# Patient Record
Sex: MALE | Race: WHITE | NOT HISPANIC OR LATINO | URBAN - METROPOLITAN AREA
[De-identification: names, ages, dates, MRNs, and addresses within clinical notes are randomized per-mention and may not be internally consistent; named-entity substitution may affect disease eponyms.]

---

## 2018-09-12 ENCOUNTER — OUTPATIENT (OUTPATIENT)
Dept: OUTPATIENT SERVICES | Facility: HOSPITAL | Age: 63
LOS: 1 days | Discharge: HOME | End: 2018-09-12

## 2018-09-12 DIAGNOSIS — M25.561 PAIN IN RIGHT KNEE: ICD-10-CM

## 2022-07-08 PROBLEM — Z00.00 ENCOUNTER FOR PREVENTIVE HEALTH EXAMINATION: Status: ACTIVE | Noted: 2022-07-08

## 2022-07-12 ENCOUNTER — APPOINTMENT (OUTPATIENT)
Dept: PAIN MANAGEMENT | Facility: CLINIC | Age: 67
End: 2022-07-12

## 2022-07-12 VITALS
WEIGHT: 205 LBS | SYSTOLIC BLOOD PRESSURE: 146 MMHG | HEIGHT: 71 IN | DIASTOLIC BLOOD PRESSURE: 91 MMHG | HEART RATE: 74 BPM | BODY MASS INDEX: 28.7 KG/M2

## 2022-07-12 DIAGNOSIS — Z86.39 PERSONAL HISTORY OF OTHER ENDOCRINE, NUTRITIONAL AND METABOLIC DISEASE: ICD-10-CM

## 2022-07-12 DIAGNOSIS — Z87.891 PERSONAL HISTORY OF NICOTINE DEPENDENCE: ICD-10-CM

## 2022-07-12 LAB
AMPHET UR-MCNC: NEGATIVE
BARBITURATES UR-MCNC: NEGATIVE
BENZODIAZ UR-MCNC: POSITIVE
COCAINE METAB.OTHER UR-MCNC: NEGATIVE
DRUG SCREEN, URINE ROUTINE: NEGATIVE
METHADONE UR-MCNC: NEGATIVE
METHAQUALONE UR-MCNC: NEGATIVE
OPIATES UR-MCNC: POSITIVE
PCP UR-MCNC: NEGATIVE
PROPOXYPH UR QL: NEGATIVE
THC UR QL: NEGATIVE

## 2022-07-12 PROCEDURE — 99214 OFFICE O/P EST MOD 30 MIN: CPT | Mod: PA

## 2022-07-12 PROCEDURE — 99072 ADDL SUPL MATRL&STAF TM PHE: CPT

## 2022-07-12 PROCEDURE — 80305 DRUG TEST PRSMV DIR OPT OBS: CPT | Mod: QW

## 2022-07-12 NOTE — PHYSICAL EXAM
[Normal Coordination] : normal coordination [Normal Sensation] : normal sensation [Normal Mood and Affect] : normal mood and affect [Orientated] : orientated [Normal Skin] : normal skin [Well Developed] : well developed [Well Nourished] : well nourished [de-identified] : Palpation of the thoracic/lumbar spine is as follows: bilateral lumbar paraspinal tenderness. Range of motion of the thoracic and lumbar spine is as follows: Diminished range of motion in all planes. Gait and function is as follows: mildly antalgic gait.

## 2022-07-12 NOTE — REASON FOR VISIT
[Follow-Up Visit] : a follow-up pain management visit [FreeTextEntry2] : Follow up Lumbar pain . Patient needs med refill

## 2022-07-12 NOTE — ASSESSMENT
[FreeTextEntry1] : This is a 65 yo M who suffered a work related injury resulting in chronic lumbosacral pain complaints due to degenerative changes for which the use of medication is to continue at this time.\par \par moderate complexity- UDS collected.

## 2022-07-12 NOTE — HISTORY OF PRESENT ILLNESS
[FreeTextEntry1] : A continuing active encounter took place at this time; previous history and physical exam reviewed.\par \par HISTORY OF PRESENT ILLNESS: As a review, Mr. Andrew Smith is a former  who states he was lifting a large piece of granite back on July 21, 2008, injuring his lower back and left shoulder. He states that he worked for another two years and ended up retiring in January 2010. He states that his left shoulder pain improved, however, he is having persistent lower back pain which radiates down his left leg and is associated with paresthesias and dysesthesias. He describes his pain as severe, constant, 100% of the time, in no particular pattern, burning, sharp, shooting, dull, and aching with numbness in the lower extremities.\par \par TODAY: he presents for a revisit encounter. He notes a slight increase in pain and stiffness within the low back. Radicular features at times noted but for the most part he notes isolated low back pain with functional deficits. The use of medication in the form of Percocet at 7.5/325m PO q8h provides benefit at 50% or higher and he remains content with his improvements in pain at this time.\par \par Of note, we have discussed possibly reducing his regimen over the coming weeks; he will consider such change at his September appt.

## 2022-07-12 NOTE — REVIEW OF SYSTEMS
[Arthralgia] : arthralgia [Chills] : no chills [Discharge] : no discharge [Decrease Hearing] : no decrease in hearing [Lower Ext Edema] : no lower extremity edema [SOB at rest] : no shortness of breath at rest [Abdominal Pain] : no abdominal pain

## 2022-07-12 NOTE — DISCUSSION/SUMMARY
[de-identified] : The patient is stable on current pain medication with analgesia and without notable side effects or any obvious aberrant behaviors exhibited. Will renew medication today.\par \par Urine drug screening collected today with rapid sample result consistent with given regimen. Sample to be sent for confirmatory testing with additional relief at a later time.\par \par f/u 4 weeks\par \par I personally reviewed with the PA, this patient's history and physical exam findings, as documented above. I have discussed the relevant areas of concern, having direct implications to the presenting problems and illnesses, and I have personally examined all pertinent and positive and negative findings, which impact on the prior pain management treatment. \par \par DELGADO Michele MD\par \par \par

## 2022-09-13 ENCOUNTER — APPOINTMENT (OUTPATIENT)
Dept: PAIN MANAGEMENT | Facility: CLINIC | Age: 67
End: 2022-09-13

## 2022-10-21 ENCOUNTER — APPOINTMENT (OUTPATIENT)
Dept: PAIN MANAGEMENT | Facility: CLINIC | Age: 67
End: 2022-10-21

## 2022-10-21 VITALS
HEART RATE: 67 BPM | BODY MASS INDEX: 28.7 KG/M2 | HEIGHT: 71 IN | SYSTOLIC BLOOD PRESSURE: 135 MMHG | DIASTOLIC BLOOD PRESSURE: 88 MMHG | WEIGHT: 205 LBS

## 2022-10-21 PROCEDURE — 99214 OFFICE O/P EST MOD 30 MIN: CPT

## 2022-10-21 PROCEDURE — 96102W: CUSTOM

## 2022-10-21 PROCEDURE — 99072 ADDL SUPL MATRL&STAF TM PHE: CPT

## 2022-10-21 RX ORDER — PREDNISONE 50 MG/1
50 TABLET ORAL
Qty: 5 | Refills: 0 | Status: ACTIVE | COMMUNITY
Start: 2022-06-10

## 2022-10-21 RX ORDER — NIRMATRELVIR AND RITONAVIR 300-100 MG
20 X 150 MG & KIT ORAL
Qty: 30 | Refills: 0 | Status: ACTIVE | COMMUNITY
Start: 2022-08-01

## 2022-10-21 RX ORDER — BENZONATATE 200 MG/1
200 CAPSULE ORAL
Qty: 30 | Refills: 0 | Status: ACTIVE | COMMUNITY
Start: 2022-08-01

## 2022-10-21 NOTE — HISTORY OF PRESENT ILLNESS
[FreeTextEntry1] : HISTORY OF PRESENT ILLNESS: As a review, Mr. Andrew Smith is a former  who states he was lifting a large piece of granite back on July 21, 2008, injuring his lower back and left shoulder. He states that he worked for another two years and ended up retiring in January 2010. He states that his left shoulder pain improved, however, he is having persistent lower back pain which radiates down his left leg and is associated with paresthesias and dysesthesias. He describes his pain as severe, constant, 100% of the time, in no particular pattern, burning, sharp, shooting, dull, and aching with numbness in the lower extremities.\par \par PATIENT PRESENTS FOR FOLLOW UP: He presents with ongoing lower back and shoulder pain following a work related injury on July 21, 2008. He notes a slight increase in pain and stiffness within the low back due to increased physical activity in the last week. Radicular features at times noted but for the most part he notes isolated low back pain with functional deficits. The use of medication in the form of Percocet at 7.5/325m PO q8h provides benefit at 50% or higher and he remains content with his improvements in pain at this time. Decreasing the medication was discussed but he wishes to hold off due to the increase in pain within the last week. He denies any side effects or adverse reactions.

## 2022-10-21 NOTE — ASSESSMENT
[FreeTextEntry1] : This is a 66 yo M who suffered a work related injury resulting in chronic lumbosacral pain complaints due to degenerative changes for which the use of medication is to continue at this time. Medication will be refilled today and he will follow up in 2 months for refill and further evaluation. \par \par moderate complexity.\par \par All this patients questions were answered and the conversation was understood well.\par \par I, Chaparrita Ken, attest that this documentation has been prepared under the direction and in the presence of Provider Elinor Ramos MD.\par \par \par Thank you for allowing me to assist in the management of this patient. \par \par \par Best Regards, \par \par \par Elinor Ramos M.D., [facility]\par \par \par Diplomate, American Board of Physical Medicine and Rehabilitation\par Diplomate, American Board of Pain Medicine \par Diplomate, American Board of Pain Management\par

## 2022-10-21 NOTE — DATA REVIEWED
[FreeTextEntry1] :  \par IMAGING STUDIES:  \par 1. MRI of the lumbosacral spine on 9/2/16 - IMPRESSION: An interval change is evident when comparison is made to the prior study of 2013 in that the previously observed disc herniation paracentral and anterolateral to the left of midline at L5-S1 is no longer evident. An interval change is also appreciated in that there is a small subligamentous disc herniation within the right intravertebral foramen at L2-3 approaching the exiting right second lumbar root far laterally outside the intravertebral foramen, not seen on the prior study. Small diffuse annular bulges are again noted at L4-5, L3-4, and L1-2, unchanged from the prior study.  \par 2. MRI of the left hip on 10/17/16 showed severe osteoarthritis.  \par \par \par SOAPP: 10/21/22\par \par UDS: 7/12/22 + bzo, oxy \par \par [place] REGISTRY: Checked\par

## 2022-10-21 NOTE — PHYSICAL EXAM
[Normal Coordination] : normal coordination [Normal DTR UE/LE] : normal DTR UE/LE  [Normal Sensation] : normal sensation [Normal Mood and Affect] : normal mood and affect [Orientated] : orientated [Able to Communicate] : able to communicate [Normal Skin] : normal skin [Well Developed] : well developed [Well Nourished] : well nourished [Flexion] : flexion [Extension] : extension [] : patient ambulates without assistive device

## 2022-11-23 ENCOUNTER — APPOINTMENT (OUTPATIENT)
Dept: PAIN MANAGEMENT | Facility: CLINIC | Age: 67
End: 2022-11-23

## 2022-12-16 ENCOUNTER — APPOINTMENT (OUTPATIENT)
Dept: PAIN MANAGEMENT | Facility: CLINIC | Age: 67
End: 2022-12-16

## 2022-12-16 VITALS — HEIGHT: 71 IN | BODY MASS INDEX: 28.7 KG/M2 | WEIGHT: 205 LBS

## 2022-12-16 PROCEDURE — 99072 ADDL SUPL MATRL&STAF TM PHE: CPT

## 2022-12-16 PROCEDURE — 99214 OFFICE O/P EST MOD 30 MIN: CPT

## 2022-12-16 NOTE — PHYSICAL EXAM
[Normal Coordination] : normal coordination [Normal DTR UE/LE] : normal DTR UE/LE  [Normal Sensation] : normal sensation [Normal Mood and Affect] : normal mood and affect [Orientated] : orientated [Able to Communicate] : able to communicate [Normal Skin] : normal skin [Well Developed] : well developed [Well Nourished] : well nourished [Flexion] : flexion [Extension] : extension [] : no swelling

## 2022-12-16 NOTE — HISTORY OF PRESENT ILLNESS
[FreeTextEntry1] : HISTORY OF PRESENT ILLNESS: As a review, Mr. Andrew Smith is a former  who states he was lifting a large piece of granite back on July 21, 2008, injuring his lower back and left shoulder. He states that he worked for another two years and ended up retiring in January 2010. He states that his left shoulder pain improved, however, he is having persistent lower back pain which radiates down his left leg and is associated with paresthesias and dysesthesias. He describes his pain as severe, constant, 100% of the time, in no particular pattern, burning, sharp, shooting, dull, and aching with numbness in the lower extremities.\par \par PATIENT PRESENTS FOR FOLLOW UP: He presents with ongoing lower back and shoulder pain following a work related injury on July 21, 2008. He notes a slight increase in pain and stiffness within the low back due to increased physical activity in the last week. Radicular features at times noted but for the most part he notes isolated low back pain with functional deficits. The sciatic pain has increased within the last week and a half. \par \par The use of medication in the form of Percocet at 7.5/325m PO q8h provides benefit at 50% or higher and he remains content with his improvements in pain at this time. Decreasing the Percocet from TID to BID was discussed today. While he is hesitant to trial the medication BID, he is understandable. He denies any side effects or adverse reactions.

## 2022-12-16 NOTE — DATA REVIEWED
[FreeTextEntry1] :  1. MRI of the lumbosacral spine on 9/2/16 - IMPRESSION: An interval change is evident when comparison is made to the prior study of 2013 in that the previously observed disc herniation paracentral and anterolateral to the left of midline at L5-S1 is no longer evident. An interval change is also appreciated in that there is a small subligamentous disc herniation within the right intravertebral foramen at L2-3 approaching the exiting right second lumbar root far laterally outside the intravertebral foramen, not seen on the prior study. Small diffuse annular bulges are again noted at L4-5, L3-4, and L1-2, unchanged from the prior study.  \par 2. MRI of the left hip on 10/17/16 showed severe osteoarthritis.  \par \par \par SOAPP: low on 10/21/22\par Low risk: Patient has combination of a low risk SOAP and no risk factors. UDS would be repeated randomly every quarter.\par \par UDS: 7/12/22 + bzo, oxy \par \par [place] REGISTRY: Checked\par

## 2022-12-16 NOTE — ASSESSMENT
[FreeTextEntry1] : This is a 68 yo M who suffered a work related injury resulting in chronic lumbosacral pain complaints due to degenerative changes for which the use of medication is to continue at this time. I will decrease the Percocet from TID to BID and send Meloxicam 15 mg to be taken once daily. Medication will be refilled today and he will follow up 4 weeks for refill and further evaluation. Moderate complexity. All this patients questions were answered and the conversation was understood well. \par \par I advised the patient they must keep their medication under a lock and key, or in a safe place away from children or other individuals. This medication given is solely for the patient and under no circumstances to be shared. Patient verbalized this and is in agreement with the aforementioned. I explained the risk of addiction, tolerance, and withdrawals. UDS will be done randomly and a drug agreement was signed.\par \par I explained the risk of addiction, tolerance and withdrawals. UDS will be done randomly and a drug agreement was signed.\par \par I, Chaparrita Ken, attest that this documentation has been prepared under the direction and in the presence of Provider Elinor Ramos MD.\par \par \par Thank you for allowing me to assist in the management of this patient. \par \par \par Best Regards, \par \par \par Elinor Ramos M.D., [facility]\par \par \par Diplomate, American Board of Physical Medicine and Rehabilitation\par Diplomate, American Board of Pain Medicine \par Diplomate, American Board of Pain Management\par

## 2023-01-20 ENCOUNTER — APPOINTMENT (OUTPATIENT)
Dept: PAIN MANAGEMENT | Facility: CLINIC | Age: 68
End: 2023-01-20
Payer: OTHER MISCELLANEOUS

## 2023-01-20 VITALS
HEIGHT: 71 IN | BODY MASS INDEX: 28.7 KG/M2 | HEART RATE: 73 BPM | WEIGHT: 205 LBS | DIASTOLIC BLOOD PRESSURE: 101 MMHG | SYSTOLIC BLOOD PRESSURE: 159 MMHG

## 2023-01-20 PROCEDURE — 99072 ADDL SUPL MATRL&STAF TM PHE: CPT

## 2023-01-20 PROCEDURE — 99213 OFFICE O/P EST LOW 20 MIN: CPT | Mod: ACP

## 2023-01-20 NOTE — REASON FOR VISIT
[Follow-Up Visit] : a follow-up pain management visit [FreeTextEntry2] : Follow up Lumbar pain . Patient needs med refill  PAST MEDICAL HISTORY:  Migraines

## 2023-01-20 NOTE — ASSESSMENT
[FreeTextEntry1] : This is a 66 yo M who suffered a work related injury resulting in chronic lumbar and L shoulder pain.  We will continue to prescribe Percocet as mentioned and f/u in 4 weeks for re evaluation.  He is aware if there are any issues he can contact the office. \par \par I personally reviewed with the PA, this patient's history and physical exam findings, as documented above. I have discussed the relevant areas of concern, having direct implications to the presenting problems and illnesses, and I have personally examined all pertinent and positive and negative findings, which impact on the prior pain management treatment. \par \par \par Check of the  registry reveals compliance in regards to narcotic medication management use\par \par \par Swetha Cardenas MS, PA-C\par Elinor Ramos MD\par \par

## 2023-01-20 NOTE — HISTORY OF PRESENT ILLNESS
[FreeTextEntry1] : HISTORY OF PRESENT ILLNESS: As a review, Mr. Andrew Smith is a former  who states he was lifting a large piece of granite back on July 21, 2008, injuring his lower back and left shoulder. He states that he worked for another two years and ended up retiring in January 2010. He states that his left shoulder pain improved, however, he is having persistent lower back pain which radiates down his left leg and is associated with paresthesias and dysesthesias. He describes his pain as severe, constant, 100% of the time, in no particular pattern, burning, sharp, shooting, dull, and aching with numbness in the lower extremities.\par \par TODAY: I had the pleasure of seeing Mr. Smith today in follow up.  His previous history and physical findings have been reviewed.\par \par He is under our care for lumbar and L shoulder pain s/p work injury which he is receiving continuing active treatment for.  He states he continues to experience lower back and shoulder pain following a work related injury on July 21, 2008. He notes a slight increase in pain and stiffness within the low back due to increased physical activity in the last week stating he somehow threw his back out.  He has been using Percocet at 7.5/325m PO BID which was lowered from TID dosing at his last visit and states the adjustment has been difficult.  He is experiencing 40% relief currently without side effects and we will continue as is without change.

## 2023-02-17 ENCOUNTER — APPOINTMENT (OUTPATIENT)
Dept: PAIN MANAGEMENT | Facility: CLINIC | Age: 68
End: 2023-02-17
Payer: OTHER MISCELLANEOUS

## 2023-02-17 ENCOUNTER — RX RENEWAL (OUTPATIENT)
Age: 68
End: 2023-02-17

## 2023-02-17 VITALS
HEART RATE: 78 BPM | SYSTOLIC BLOOD PRESSURE: 156 MMHG | HEIGHT: 71 IN | DIASTOLIC BLOOD PRESSURE: 98 MMHG | BODY MASS INDEX: 28.7 KG/M2 | WEIGHT: 205 LBS

## 2023-02-17 PROCEDURE — 99214 OFFICE O/P EST MOD 30 MIN: CPT

## 2023-02-17 PROCEDURE — 99072 ADDL SUPL MATRL&STAF TM PHE: CPT

## 2023-02-17 NOTE — ASSESSMENT
[FreeTextEntry1] : This is a 68 yo M who suffered a work related injury resulting in chronic lumbosacral pain complaints due to degenerative changes for which the use of medication is to continue unchanged. He will continue to utilize the Percocet 7.5/325 BID and Meloxicam 15mg once a day for symptom management without change. Discussion of decreasing the medication was discussed today but patient would like to move forward without change today, Medication will be refilled today and he will follow up 4 weeks for refill and further evaluation. Moderate complexity. All this patients questions were answered and the conversation was understood well. \par \par I advised the patient they must keep their medication under a lock and key, or in a safe place away from children or other individuals. This medication given is solely for the patient and under no circumstances to be shared. Patient verbalized this and is in agreement with the aforementioned. I explained the risk of addiction, tolerance, and withdrawals. UDS will be done randomly and a drug agreement was signed.\par \par I explained the risk of addiction, tolerance and withdrawals. UDS will be done randomly and a drug agreement was signed.\par \par I, Janelle Vuong, attest that this documentation has been prepared under the direction and in the presence of Provider Elinor Ramos MD.\par \par \par Thank you for allowing me to assist in the management of this patient. \par \par \par Best Regards, \par \par \par Elinor Ramos M.D., FA[facility]\par \par \par Diplomate, American Board of Physical Medicine and Rehabilitation\par Diplomate, American Board of Pain Medicine \par Diplomate, American Board of Pain Management\par

## 2023-02-17 NOTE — HISTORY OF PRESENT ILLNESS
[FreeTextEntry1] : HISTORY OF PRESENT ILLNESS: As a review, Mr. Andrew Smith is a 67-year-old former  who states he was lifting a large piece of granite back on July 21, 2008, injuring his lower back and left shoulder. He states that he worked for another two years and ended up retiring in January 2010. He states that his left shoulder pain improved, however, he is having persistent lower back pain which radiates down his left leg and is associated with paresthesias and dysesthesias. He describes his pain as severe, constant, 100% of the time, in no particular pattern, burning, sharp, shooting, dull, and aching with numbness in the lower extremities.\par \par PATIENT PRESENTS FOR FOLLOW UP: He presents with ongoing lower back and shoulder pain following a work related injury on July 21, 2008. Patient complains of increased pain in the lower back after recently moving a couch. Patient used an old prescription of Prednisone to relieve his symptoms. Patient reports spasms in the back in the last month but has since resolved. \par \par The use of medication in the form of Percocet at 7.5/325m PO BID and Meloxicam 15mg once a day provides benefit at 50% or higher and he remains content with his improvements in pain at this time. He denies any side effects or adverse reactions.  We did have a lengthy discussion regarding his medication.  I did recommend decreasing the strength however he states that at this time he would not be able to remain functional in terms of his activities of daily living.  We will revisit this discussion again in the future.  At the current time he uses the medication appropriately and does not demonstrate any aberrant behavior.

## 2023-03-17 ENCOUNTER — APPOINTMENT (OUTPATIENT)
Dept: PAIN MANAGEMENT | Facility: CLINIC | Age: 68
End: 2023-03-17

## 2023-03-29 ENCOUNTER — LABORATORY RESULT (OUTPATIENT)
Age: 68
End: 2023-03-29

## 2023-03-29 ENCOUNTER — RX RENEWAL (OUTPATIENT)
Age: 68
End: 2023-03-29

## 2023-03-29 ENCOUNTER — APPOINTMENT (OUTPATIENT)
Dept: PAIN MANAGEMENT | Facility: CLINIC | Age: 68
End: 2023-03-29
Payer: OTHER MISCELLANEOUS

## 2023-03-29 VITALS — WEIGHT: 205 LBS | BODY MASS INDEX: 28.7 KG/M2 | HEIGHT: 71 IN

## 2023-03-29 LAB
AMP / AMPHETAMINE: NEGATIVE
BAR / SECOBARBITAL: NEGATIVE
BUP / BUPRENORPHINE: NEGATIVE
BZO / OXAZEPAM: POSITIVE
COC / COCAINE: NEGATIVE
CREATININE: 50 MG/DL
MDMA / METHYLENEDIOXYMETHAMPHETAMINE: NEGATIVE
MET / METHAMPHETAMINES: NEGATIVE
MOP / MORPHINE: NEGATIVE
MTD / METHADONE: NEGATIVE
OXY / OXYCODONE: POSITIVE
PCP / PHENCYCLIDINE: NEGATIVE
PH: 5
SPECIFIC GRAVITY: 1.02
TEMPERATURE: 92 C
THC / MARIJUANA: NEGATIVE

## 2023-03-29 PROCEDURE — 80305 DRUG TEST PRSMV DIR OPT OBS: CPT | Mod: QW

## 2023-03-29 PROCEDURE — 99213 OFFICE O/P EST LOW 20 MIN: CPT | Mod: ACP

## 2023-03-29 NOTE — HISTORY OF PRESENT ILLNESS
[FreeTextEntry1] : HISTORY OF PRESENT ILLNESS: As a review, Mr. Andrew Smith is a 67-year-old former  who states he was lifting a large piece of granite back on July 21, 2008, injuring his lower back and left shoulder. He states that he worked for another two years and ended up retiring in January 2010. He states that his left shoulder pain improved, however, he is having persistent lower back pain which radiates down his left leg and is associated with paresthesias and dysesthesias. He describes his pain as severe, constant, 100% of the time, in no particular pattern, burning, sharp, shooting, dull, and aching with numbness in the lower extremities.\par \par PATIENT PRESENTS FOR FOLLOW UP: He presents with ongoing lower back and shoulder pain following a work related injury on July 21, 2008. His back pain has been exacerbated since his last visit as he was doing at home workouts. He said he twisted in an awkward manner and felt extreme pain for 3 days. He also continues to have intermittent spasms. His pain is also radiating towards his lower extremities bilaterally and is intermittent. His pain today is a 6/10. The use of medication in the form of Percocet at 7.5/325m PO BID and Meloxicam 15mg once a day provides benefit at 50% or higher and he remains content with his improvements in pain at this time. He wishes to speak about adjusting his medications once the weather gets warmer as the cold aggravates his pain. We will continue as is without change.\par \par \par UDS to be completed today\par \par

## 2023-03-29 NOTE — ASSESSMENT
[FreeTextEntry1] : This is a 68 yo M who suffered a work related injury resulting in chronic lumbosacral pain complaints due to degenerative changes for which the use of medication is to continue unchanged. He will continue to utilize the Percocet 7.5/325 BID and Meloxicam 15mg once a day for symptom management. We will adjust his medications during the summer months. We will continue with medication management and he will follow up in 4 weeks for further evaluation. \par \par I have consulted the  registry for the purpose of reviewing the patient's controlled substance.\par \par Urine drug screening collected today with rapid sample result consistent with given regimen. Sample to be sent for confirmatory testing.\par \par Overall there is at least a 30-50% reduction in pain with the prescribed analgesics. The patient denies any adverse side effects due to the medication (sleeping disturbance, constipation, sleepiness, hallucinations and/or urination problems).\par \par Bobby Ireland PA-C\par Elinor Ramos MD\par

## 2023-03-31 LAB
PM 6 MAM: NEGATIVE NG/ML
PM 7-AMINO-CLONAZ: NEGATIVE NG/ML
PM ALPHA-HYDROXY-ALPRAZOLAM: NEGATIVE NG/ML
PM ALPHA-HYDROXY-MIDAZOLAM: NEGATIVE NG/ML
PM ALPRAZOLAM: NEGATIVE NG/ML
PM AMOBARBITAL: NEGATIVE NG/ML
PM AMPHETAMINE INTERP: NEGATIVE
PM AMPHETAMINE: NEGATIVE NG/ML
PM BARBURATES INTERP: POSITIVE
PM BEG: NEGATIVE NG/ML
PM BENZODIAZEPINES INTERP: POSITIVE
PM BUPRENORPHINE INTERP: NEGATIVE
PM BUPRENORPHINE: NEGATIVE NG/ML
PM BUTALBITAL: 159 NG/ML
PM CLONAZEPAM: NEGATIVE NG/ML
PM COCAINE INTERP: NEGATIVE
PM COCAINE: NEGATIVE NG/ML
PM CODIENE: NEGATIVE NG/ML
PM COTININE: NEGATIVE NG/ML
PM DIAZEPAM: NEGATIVE NG/ML
PM DIHYROCODEINE: NEGATIVE NG/ML
PM EDDP: NEGATIVE NG/ML
PM FENTANYL INTERP: NEGATIVE
PM FENTANYL: NEGATIVE NG/ML
PM FLUNITRAZEPAM: NEGATIVE NG/ML
PM FLURAZEPAM: NEGATIVE NG/ML
PM HYDROCODONE: NEGATIVE NG/ML
PM HYDROMORPHONE: NEGATIVE NG/ML
PM LORAZEPAM: NEGATIVE NG/ML
PM MARIJUANA (DELTA-9-THC): NEGATIVE NG/ML
PM MARIJUANA INTERP: NEGATIVE
PM MDA: NEGATIVE NG/ML
PM MDEA: NEGATIVE NG/ML
PM MDMA: NEGATIVE NG/ML
PM MEPERIDINE: NEGATIVE NG/ML
PM METHADONE INTERP: NEGATIVE
PM METHADONE: NEGATIVE NG/ML
PM METHAMPHETAMINE: NEGATIVE NG/ML
PM MIDAZOLAM: NEGATIVE NG/ML
PM MORPHINE: NEGATIVE NG/ML
PM NALOXONE: NEGATIVE NG/ML
PM NALTREXONE: NEGATIVE NG/ML
PM NICOTINE INTERP: NEGATIVE
PM NORBUPRENORPHINE: NEGATIVE NG/ML
PM NORDIAZEPAM: 610 NG/ML
PM NORMEPERIDINE: NEGATIVE NG/ML
PM NOROXYCODONE: >1000 NG/ML
PM OPIOID INTERP: NEGATIVE
PM OXAZEPAM: 897 NG/ML
PM OXXYCODONE INTERP: POSITIVE
PM OXYCODONE: >1000 NG/ML
PM OXYMORPHONE: 830 NG/ML
PM PCP: NEGATIVE NG/ML
PM PHENCYCLIDINE INTERP: NEGATIVE
PM PHENOBARBITAL: NEGATIVE NG/ML
PM PPX: NEGATIVE NG/ML
PM PROPOXYPHENE INTERP: NEGATIVE
PM SECOBARBITAL: NEGATIVE NG/ML
PM SUFENTANIL: NEGATIVE NG/ML
PM TAPENTADOL: NEGATIVE NG/ML
PM TEMAZEPAM: >1000 NG/ML
PM TRAMADOL INTERP: NEGATIVE
PM TRAMADOL: NEGATIVE NG/ML

## 2023-04-14 ENCOUNTER — APPOINTMENT (OUTPATIENT)
Dept: PAIN MANAGEMENT | Facility: CLINIC | Age: 68
End: 2023-04-14

## 2023-04-26 ENCOUNTER — APPOINTMENT (OUTPATIENT)
Dept: PAIN MANAGEMENT | Facility: CLINIC | Age: 68
End: 2023-04-26
Payer: OTHER MISCELLANEOUS

## 2023-04-26 VITALS — HEIGHT: 71 IN | WEIGHT: 205 LBS | BODY MASS INDEX: 28.7 KG/M2

## 2023-04-26 PROCEDURE — 99213 OFFICE O/P EST LOW 20 MIN: CPT | Mod: ACP

## 2023-04-26 NOTE — ASSESSMENT
[FreeTextEntry1] : This is a 68 yo M who suffered a work related injury resulting in chronic lumbosacral pain complaints due to degenerative changes. He will continue with medication management unchanged due to his increasing activity level during the upcoming months. He will follow up in 4 weeks for further evaluation. \par \par I have consulted the  registry for the purpose of reviewing the patient's controlled substance.\par \par Overall there is at least a 30-50% reduction in pain with the prescribed analgesics. The patient denies any adverse side effects due to the medication (sleeping disturbance, constipation, sleepiness, hallucinations and/or urination problems).\par \par Bobby Ireland PA-C\par Elinor Ramos MD\par

## 2023-04-26 NOTE — HISTORY OF PRESENT ILLNESS
[FreeTextEntry1] : HISTORY OF PRESENT ILLNESS: As a review, Mr. Andrew Smtih is a 67-year-old former  who states he was lifting a large piece of granite back on July 21, 2008, injuring his lower back and left shoulder. He states that he worked for another two years and ended up retiring in January 2010. He states that his left shoulder pain improved, however, he is having persistent lower back pain which radiates down his left leg and is associated with paresthesias and dysesthesias. He describes his pain as severe, constant, 100% of the time, in no particular pattern, burning, sharp, shooting, dull, and aching with numbness in the lower extremities.\par \par PATIENT PRESENTS FOR FOLLOW UP: Patient last seen on 3/29/23. He is under our care for his chronic lower back pain and shoulder pain following a work related injury on July 21, 2008. He continues with stifness and spasms of his lower back. He admitted today that with the weather getting warmer he has been more active which is contributing to more pain. He does state that with his current medication regimen he is able to preform his ADLs with additional comfort which is very important to him during the warm weather. The use of medication in the form of Percocet at 7.5/325 mg PO BID and Meloxicam 15 mg once a day provides over 50% relief. He wishes to continue as is without change. \par \par \par UDS 3/29/23- consistent \par \par

## 2023-05-24 ENCOUNTER — APPOINTMENT (OUTPATIENT)
Dept: PAIN MANAGEMENT | Facility: CLINIC | Age: 68
End: 2023-05-24
Payer: OTHER MISCELLANEOUS

## 2023-05-24 VITALS — BODY MASS INDEX: 28.7 KG/M2 | HEIGHT: 71 IN | WEIGHT: 205 LBS

## 2023-05-24 PROCEDURE — 99213 OFFICE O/P EST LOW 20 MIN: CPT | Mod: ACP

## 2023-05-24 NOTE — HISTORY OF PRESENT ILLNESS
[FreeTextEntry1] : HISTORY OF PRESENT ILLNESS: As a review, Mr. Andrew Smith is a 67-year-old former  who states he was lifting a large piece of granite back on July 21, 2008, injuring his lower back and left shoulder. He states that he worked for another two years and ended up retiring in January 2010. He states that his left shoulder pain improved, however, he is having persistent lower back pain which radiates down his left leg and is associated with paresthesias and dysesthesias. He describes his pain as severe, constant, 100% of the time, in no particular pattern, burning, sharp, shooting, dull, and aching with numbness in the lower extremities.\par \par PATIENT PRESENTS FOR FOLLOW UP: Patient last seen on 4/26/23. He is under our care for his chronic lower back pain and shoulder pain following a work related injury on July 21, 2008. His pain continues to be stable on a regimen of Percocet at 7.5/325 mg PO BID and Meloxicam 15 mg once a day. This regimen provides him with over 50% relief on most days. He is able to stay active and preform his ADLs which is important to him during the warmer months. He wishes to continue as is without change. \par \par UDS 3/29/23- consistent \par \par

## 2023-05-24 NOTE — ASSESSMENT
[FreeTextEntry1] : This is a 66 yo M who suffered a work related injury resulting in chronic lumbosacral pain complaints due to degenerative changes. We will continue to prescribe Percocet at 7.5/325 mg PO BID and Meloxicam 15 mg once a day. He will follow up in 4 weeks for further evaluation. \par \par I have consulted the  registry for the purpose of reviewing the patient's controlled substance.\par \par Overall there is at least a 30-50% reduction in pain with the prescribed analgesics. The patient denies any adverse side effects due to the medication (sleeping disturbance, constipation, sleepiness, hallucinations and/or urination problems).\par \par Bobby Ireland PA-C\par Elinor Ramos MD\par

## 2023-06-21 ENCOUNTER — APPOINTMENT (OUTPATIENT)
Dept: PAIN MANAGEMENT | Facility: CLINIC | Age: 68
End: 2023-06-21
Payer: OTHER MISCELLANEOUS

## 2023-06-21 VITALS — HEIGHT: 71 IN | WEIGHT: 205 LBS | BODY MASS INDEX: 28.7 KG/M2

## 2023-06-21 PROCEDURE — 99214 OFFICE O/P EST MOD 30 MIN: CPT | Mod: ACP

## 2023-06-21 NOTE — ASSESSMENT
[FreeTextEntry1] : This is a 68 yo M who suffered a work related injury resulting in chronic lumbosacral pain complaints due to degenerative changes. He will continue with medication management in the form of Percocet at 7.5/325 mg PO BID and Meloxicam 15 mg once a day. He will be going on vacation next month and may call in for his prescription in 4 weeks. He will follow up in 8 weeks for further evaluation. \par \par I have consulted the  registry for the purpose of reviewing the patient's controlled substance.\par \par Overall there is at least a 30-50% reduction in pain with the prescribed analgesics. The patient denies any adverse side effects due to the medication (sleeping disturbance, constipation, sleepiness, hallucinations and/or urination problems).\par \par Bobby Ireland PA-C\par Elinor Ramos MD\par

## 2023-06-21 NOTE — HISTORY OF PRESENT ILLNESS
[FreeTextEntry1] : HISTORY OF PRESENT ILLNESS: As a review, Mr. Andrew Smith is a 67-year-old former  who states he was lifting a large piece of granite back on July 21, 2008, injuring his lower back and left shoulder. He states that he worked for another two years and ended up retiring in January 2010. He states that his left shoulder pain improved, however, he is having persistent lower back pain which radiates down his left leg and is associated with paresthesias and dysesthesias. He describes his pain as severe, constant, 100% of the time, in no particular pattern, burning, sharp, shooting, dull, and aching with numbness in the lower extremities.\par \par PATIENT PRESENTS FOR FOLLOW UP: Patient last seen on 5/24/23. He is under our care for his chronic lower back pain and shoulder pain following a work related injury on July 21, 2008. He continues with lumbar pain that radiates into his lower extremities bilaterally. He notes occasional numbness and tingling. The pain is constant. Alleviated by rest and aggravated by rotational movements and ambulation.He is medically managed with Percocet at 7.5/325 mg PO BID and Meloxicam 15 mg once a day. We previously switched his regimen to Percocet 5 mg TID due to pharmacy backorder of Percocet 7.5 mg. We discussed switching back to Percocet 7.5 mg today. He is provided with over 50% relief with this regimen on most days. [place]. He states the medication allows him to do so. He wishes to continue as is without change. \par \par UDS 3/29/23- consistent \par \par

## 2023-08-16 ENCOUNTER — APPOINTMENT (OUTPATIENT)
Dept: PAIN MANAGEMENT | Facility: CLINIC | Age: 68
End: 2023-08-16
Payer: OTHER MISCELLANEOUS

## 2023-08-16 VITALS — WEIGHT: 205 LBS | HEIGHT: 71 IN | BODY MASS INDEX: 28.7 KG/M2

## 2023-08-16 PROCEDURE — 99213 OFFICE O/P EST LOW 20 MIN: CPT | Mod: ACP

## 2023-08-16 NOTE — ASSESSMENT
[FreeTextEntry1] : This is a 66 yo M who suffered a work related injury resulting in chronic lumbosacral pain complaints due to degenerative changes. He will continue with medication management in the form of Percocet at 7.5/325 mg PO BID and Meloxicam 15 mg once a day. The patient is stable on current pain medication with analgesia and without notable side effects or any obvious aberrant behaviors exhibited. Will renew medication today. He will follow up in 4 weeks for further evaluation.   I have consulted the  registry for the purpose of reviewing the patient's controlled substance.  Overall there is at least a 30-50% reduction in pain with the prescribed analgesics. The patient denies any adverse side effects due to the medication (sleeping disturbance, constipation, sleepiness, hallucinations and/or urination problems).  DELGADO Patterson MD

## 2023-08-16 NOTE — HISTORY OF PRESENT ILLNESS
[FreeTextEntry1] : HISTORY OF PRESENT ILLNESS: As a review, Mr. Andrew Smith is a 67-year-old former  who states he was lifting a large piece of granite back on July 21, 2008, injuring his lower back and left shoulder. He states that he worked for another two years and ended up retiring in January 2010. He states that his left shoulder pain improved, however, he is having persistent lower back pain which radiates down his left leg and is associated with paresthesias and dysesthesias. He describes his pain as severe, constant, 100% of the time, in no particular pattern, burning, sharp, shooting, dull, and aching with numbness in the lower extremities.  PATIENT PRESENTS FOR FOLLOW UP: Patient last seen on 6/21/23. He is under our care for his chronic lower back pain and shoulder pain following a work-related injury on July 21, 2008. He notes lumbar pain that was aggravated by planting some flower pots. Patient states he was bent down in an awkward position for an extended period of time. The pain presents with stiffness and spasms involving the lumbar paraspinals. The pain is constant. Alleviated by rest and aggravated by rotational movements and ambulation. He is medically managed with Percocet at 7.5/325 mg PO BID and Meloxicam 15 mg once a day. He is provided with over 50% relief with this regimen on most days. [place] and [place]. He states the medication allows him to do so. He wishes to continue as is without change.   UDS 3/29/23- consistent

## 2023-09-20 ENCOUNTER — APPOINTMENT (OUTPATIENT)
Dept: PAIN MANAGEMENT | Facility: CLINIC | Age: 68
End: 2023-09-20
Payer: OTHER MISCELLANEOUS

## 2023-09-20 PROCEDURE — 99214 OFFICE O/P EST MOD 30 MIN: CPT | Mod: ACP

## 2023-09-20 RX ORDER — MELOXICAM 15 MG/1
15 TABLET ORAL
Qty: 90 | Refills: 0 | Status: ACTIVE | COMMUNITY
Start: 2022-12-16 | End: 1900-01-01

## 2023-10-18 ENCOUNTER — APPOINTMENT (OUTPATIENT)
Dept: PAIN MANAGEMENT | Facility: CLINIC | Age: 68
End: 2023-10-18
Payer: OTHER MISCELLANEOUS

## 2023-10-18 VITALS
SYSTOLIC BLOOD PRESSURE: 159 MMHG | WEIGHT: 205 LBS | HEART RATE: 71 BPM | BODY MASS INDEX: 28.7 KG/M2 | DIASTOLIC BLOOD PRESSURE: 106 MMHG | HEIGHT: 71 IN

## 2023-10-18 PROCEDURE — 99214 OFFICE O/P EST MOD 30 MIN: CPT | Mod: ACP

## 2023-11-07 ENCOUNTER — OUTPATIENT (OUTPATIENT)
Dept: OUTPATIENT SERVICES | Facility: HOSPITAL | Age: 68
LOS: 1 days | End: 2023-11-07
Payer: MEDICARE

## 2023-11-07 DIAGNOSIS — J01.90 ACUTE SINUSITIS, UNSPECIFIED: ICD-10-CM

## 2023-11-07 PROCEDURE — 70220 X-RAY EXAM OF SINUSES: CPT | Mod: 26

## 2023-11-07 PROCEDURE — 70220 X-RAY EXAM OF SINUSES: CPT

## 2023-11-08 DIAGNOSIS — J01.90 ACUTE SINUSITIS, UNSPECIFIED: ICD-10-CM

## 2023-11-15 ENCOUNTER — APPOINTMENT (OUTPATIENT)
Dept: NEUROLOGY | Facility: CLINIC | Age: 68
End: 2023-11-15
Payer: MEDICARE

## 2023-11-15 VITALS — SYSTOLIC BLOOD PRESSURE: 146 MMHG | DIASTOLIC BLOOD PRESSURE: 89 MMHG | HEART RATE: 63 BPM

## 2023-11-15 VITALS — HEIGHT: 70 IN | BODY MASS INDEX: 29.35 KG/M2 | WEIGHT: 205 LBS

## 2023-11-15 PROCEDURE — 99204 OFFICE O/P NEW MOD 45 MIN: CPT

## 2023-11-16 ENCOUNTER — APPOINTMENT (OUTPATIENT)
Dept: PAIN MANAGEMENT | Facility: CLINIC | Age: 68
End: 2023-11-16

## 2023-12-08 ENCOUNTER — APPOINTMENT (OUTPATIENT)
Dept: MRI IMAGING | Facility: CLINIC | Age: 68
End: 2023-12-08
Payer: MEDICARE

## 2023-12-08 PROCEDURE — 70551 MRI BRAIN STEM W/O DYE: CPT

## 2023-12-13 ENCOUNTER — APPOINTMENT (OUTPATIENT)
Dept: PAIN MANAGEMENT | Facility: CLINIC | Age: 68
End: 2023-12-13
Payer: OTHER MISCELLANEOUS

## 2023-12-13 ENCOUNTER — LABORATORY RESULT (OUTPATIENT)
Age: 68
End: 2023-12-13

## 2023-12-13 VITALS — WEIGHT: 205 LBS | BODY MASS INDEX: 29.35 KG/M2 | HEIGHT: 70 IN

## 2023-12-13 PROCEDURE — 99214 OFFICE O/P EST MOD 30 MIN: CPT | Mod: ACP

## 2023-12-13 NOTE — ASSESSMENT
[FreeTextEntry1] : This is a 69 yo M who suffered a work-related injury resulting in chronic lumbosacral pain complaints due to degenerative changes. Shoulder pain has been stable. Lumbar pain has not been improving. He wishes to hold off on an updated MRI at this time. We discussed slowly decreasing his regimen of Percocet with the long-term goal of weaning him off completely. We will start by decreasing to Percocet 5 mg BID. He will follow up in 4 weeks for further evaluation.  I have consulted the  registry for the purpose of reviewing the patient's controlled substance.  Overall there is at least a 30-50% reduction in pain with the prescribed analgesics. The patient denies any adverse side effects due to the medication (sleeping disturbance, constipation, sleepiness, hallucinations and/or urination problems).  Total clinician time spent today on the patient is 30 minutes including preparing to see the patient, obtaining and/or reviewing and confirming history, performing medically necessary and appropriate examination, counseling and educating the patient and/or family, documenting clinical information in the EHR and communicating and/or referring to other healthcare professionals.  DELGADO Patterson MD

## 2023-12-13 NOTE — HISTORY OF PRESENT ILLNESS
[FreeTextEntry1] : HISTORY OF PRESENT ILLNESS: As a review, Mr. Andrew Smith is a 68-year-old former  who states he was lifting a large piece of granite back on July 21, 2008, injuring his lower back and left shoulder. He states that he worked for another two years and ended up retiring in January 2010. He states that his left shoulder pain improved, however, he is having persistent lower back pain which radiates down his left leg and is associated with paresthesias and dysesthesias. He describes his pain as severe, constant, 100% of the time, in no particular pattern, burning, sharp, shooting, dull, and aching with numbness in the lower extremities.  TODAY: He presents for a revisit. He is under our care for his chronic lower back pain and shoulder pain following a work-related injury on July 21, 2008. Shoulder pain has been stable. As for his lumbar pain, he is experiencing stiffness and tightness along the middle of his spine. Bending down aggravates the pain. Alleviated by rest and ambulation. He denies any radiation of pain down his legs. I again advised an updated lumbar MRI to assess for interval change. He would like to hold off.   He is medically managed with Percocet at 7.5/325 mg PO BID and Meloxicam 15 mg PRN. He is provided with over 50% relief with this regimen on most days. We discussed slowly decreasing his regimen of Percocet with the long-term goal of weaning him off completely. We will start by decreasing to Percocet 5 mg BID. He is agreeable.  UDS to be repeated today

## 2023-12-14 ENCOUNTER — APPOINTMENT (OUTPATIENT)
Dept: NEUROLOGY | Facility: CLINIC | Age: 68
End: 2023-12-14
Payer: MEDICARE

## 2023-12-14 PROCEDURE — 99214 OFFICE O/P EST MOD 30 MIN: CPT

## 2023-12-15 RX ORDER — ASPIRIN 81 MG/1
81 TABLET, DELAYED RELEASE ORAL DAILY
Qty: 30 | Refills: 1 | Status: ACTIVE | COMMUNITY
Start: 2023-12-15 | End: 1900-01-01

## 2023-12-15 NOTE — REASON FOR VISIT
[Follow-Up: _____] : a [unfilled] follow-up visit [Initial Evaluation] : an initial evaluation [FreeTextEntry1] : Dizziness

## 2023-12-15 NOTE — ASSESSMENT
[FreeTextEntry1] : 68 year old with Lacunar infarcts and possible vertigo.  He will undergo VAT/ENG as scheduled to evaluate his vertigo and I will refer him to see his PCP and cardiologist in regards to his HTN.  I will start him on ASA 81 mg in the interim and he will f/u in 6-8 weeks for reevaluation.  He is aware if there are any issues he can contact the office.  Swetha Cardenas MS, PA-C Latrell Matthews MD, Supervising Physician

## 2023-12-15 NOTE — HISTORY OF PRESENT ILLNESS
[FreeTextEntry1] : ORIGINAL PRESENTATION:  Mr. Smith is a 68-year-old male who presents today in neurologic consultation after being referred by Dr. Eaton and Dr. Lobato for symptoms of vertigo, dizziness, and lightheadedness, which follows the vertigo. This started 10/17/23 and is precipitated by turning his head quickly to one side or the other. This is also accompanied by headaches. Headaches have begun to dissipate after 2 weeks. Now, his primary concern is lightheadedness and dizziness. Patient states he can touch the left side of his head in the temple area which causes spinning. He takes Advil almost daily for the headaches up until last week.   Of note is the fact that patient saw me for these symptoms in 2014. At that time, he had episodic lightheadedness and dizziness, but not rotational vertigo. He then had motion precipitated symptoms. We did MRI of the brain and EEG which were normal. The star walking test revealed that the patient marched forward and off to the left.  TODAY:  I had the pleasure of seeing Mr. Smith accompanied by his wife today in follow up. HIs previous history and physical findings have been reviewed.  He is under our care for vertigo which is a chronic condition he is receiving continuing active treatment for.  He underwent MRI brain and VAT/ENG for further evaluation of his complaints and presents today to review the results.  VAT/ENG is scheduled for next week but MRI brain shows posterior right frontal subcortical 4mm FLAIR hyperintensity with diffusion hyperintensity suggesting a subacute lacunar infarct.  Smaller posterior right frontal subcortical 3 mm FLAIR hyperintensity likely a chronic lacunae versus microvascular disease.  Incidental focal dilatation of the cisterna magna at the level of the torcula versus small arachnoid cysts.  The patient states his BP has been elevated over the past few months and he does experience intense headaches at times.  He was unaware he had a stroke as he states he did not experience any weakness or slurred speech but does state 2-3 months ago he experienced an extremely intense headache like he has never had before and is wondering if that could be when it occurred.  He is under the care of a cardiologist and we will discuss next steps with respect to his treatment.  He states the dizziness is not a concern at this time and he wishes to hold off on any therapy for it.

## 2023-12-15 NOTE — PHYSICAL EXAM
[FreeTextEntry1] : PHYSICAL EXAMINATION: Head: Normocephalic, atraumatic. Negative TA tenderness/prominence.   Neck: Supple with full range of motion; nontender with negative bilateral Spurling's signs.   Spine: Full range of motion; nontender. Negative straight leg raise maneuvers.   Extremities: Non-tender. Atraumatic. Negative Tinel's signs.   NEUROLOGICAL EXAMINATION:   Mental Status: Patient is a good informant with intact orientation, attention, concentration, recent and remote memory. Language evaluation reveals no evidence of aphasia. Fund of knowledge is normal.   Cranial Nerves Cranial Nerves:   II, III, IV, VI: Pupils are equal, round, and reactive to light and accommodation. No evidence of afferent pupillary defect. Visual fields are full to confrontation. Eye movements are full without evidence of nystagmus or internuclear ophthalmoplegia. Funduscopic examination reveals sharp disc margins.   V: Normal jaw movements. Normal facial sensation.   VII: Normal facial motor testing.   VIII: Grossly normal hearing bilaterally.   IX, X: Palate moves symmetrically. No dysarthria.   XI: Normal shoulder shrug and sternocleidomastoid power.   XII: Tongue appears normal and protrudes in the midline.   Motor: Normal bulk, tone, and power throughout.   Muscle Stretch Reflexes (right/left): 2+ symmetrical.   Plantar Responses: Flexor bilaterally.   Coordination: Normal finger to nose and heel to shin testing, no truncal ataxia and no tremor.   Sensation: Normal primary sensation. Normal double simultaneous stimulation.   Gait and Station: Normal base, stride, and turning. Normal toe and heel walking. Normal tandem. Negative Romberg. Star walking with the eyes closed patient marched forward and off to the right.  [General Appearance - Alert] : alert [General Appearance - In No Acute Distress] : in no acute distress [General Appearance - Well Nourished] : well nourished [General Appearance - Well Developed] : well developed [General Appearance - Well-Appearing] : healthy appearing [Oriented To Time, Place, And Person] : oriented to person, place, and time [Affect] : the affect was normal [Mood] : the mood was normal [Memory Recent] : recent memory was not impaired [Memory Remote] : remote memory was not impaired [Person] : oriented to person [Place] : oriented to place [Time] : oriented to time [Short Term Intact] : short term memory intact [Remote Intact] : remote memory intact [Registration Intact] : recent registration memory intact [Cranial Nerves Optic (II)] : visual acuity intact bilaterally,  visual fields full to confrontation, pupils equal round and reactive to light [Cranial Nerves Oculomotor (III)] : extraocular motion intact [Cranial Nerves Facial (VII)] : face symmetrical [Cranial Nerves Accessory (XI - Cranial And Spinal)] : head turning and shoulder shrug symmetric [Involuntary Movements] : no involuntary movements were seen

## 2023-12-15 NOTE — REVIEW OF SYSTEMS
[Dizziness] : dizziness [Lightheadedness] : lightheadedness [Vertigo] : vertigo [Tension Headache] : tension-type headaches [Arthralgias] : arthralgias [Negative] : Neurological [FreeTextEntry4] : sinus congestion

## 2023-12-20 LAB
PM 6 MAM: NEGATIVE NG/ML
PM 7-AMINO-CLONAZ: NEGATIVE NG/ML
PM ALPHA-HYDROXY-ALPRAZOLAM: NEGATIVE NG/ML
PM ALPHA-HYDROXY-MIDAZOLAM: NEGATIVE NG/ML
PM ALPRAZOLAM: NEGATIVE NG/ML
PM AMOBARBITAL: NEGATIVE NG/ML
PM AMPHETAMINE INTERP: NEGATIVE
PM AMPHETAMINE: NEGATIVE NG/ML
PM BARBURATES INTERP: NEGATIVE
PM BEG: NEGATIVE NG/ML
PM BENZODIAZEPINES INTERP: POSITIVE
PM BUPRENORPHINE INTERP: NEGATIVE
PM BUPRENORPHINE: NEGATIVE NG/ML
PM BUTALBITAL: NEGATIVE NG/ML
PM CLONAZEPAM: NEGATIVE NG/ML
PM COCAINE INTERP: NEGATIVE
PM COCAINE: NEGATIVE NG/ML
PM CODIENE: NEGATIVE NG/ML
PM COTININE: NEGATIVE NG/ML
PM DIAZEPAM: NEGATIVE NG/ML
PM DIHYROCODEINE: NEGATIVE NG/ML
PM EDDP: NEGATIVE NG/ML
PM FENTANYL INTERP: NEGATIVE
PM FENTANYL: NEGATIVE NG/ML
PM FLUNITRAZEPAM: NEGATIVE NG/ML
PM FLURAZEPAM: NEGATIVE NG/ML
PM HYDROCODONE: NEGATIVE NG/ML
PM HYDROMORPHONE: NEGATIVE NG/ML
PM LORAZEPAM: NEGATIVE NG/ML
PM MARIJUANA (DELTA-9-THC): NEGATIVE NG/ML
PM MARIJUANA INTERP: NEGATIVE
PM MDA: NEGATIVE NG/ML
PM MDEA: NEGATIVE NG/ML
PM MDMA: NEGATIVE NG/ML
PM MEPERIDINE: NEGATIVE NG/ML
PM METHADONE INTERP: NEGATIVE
PM METHADONE: NEGATIVE NG/ML
PM METHAMPHETAMINE: NEGATIVE NG/ML
PM MIDAZOLAM: NEGATIVE NG/ML
PM MORPHINE: NEGATIVE NG/ML
PM NALOXONE: NEGATIVE NG/ML
PM NALTREXONE: NEGATIVE NG/ML
PM NICOTINE INTERP: NEGATIVE
PM NORBUPRENORPHINE: NEGATIVE NG/ML
PM NORDIAZEPAM: 242 NG/ML
PM NORFENTANYL: NEGATIVE NG/ML
PM NORMEPERIDINE: NEGATIVE NG/ML
PM NOROXYCODONE: >1000 NG/ML
PM OPIOID INTERP: NEGATIVE
PM OXAZEPAM: 472 NG/ML
PM OXXYCODONE INTERP: POSITIVE
PM OXYCODONE: >1000 NG/ML
PM OXYMORPHONE: 463 NG/ML
PM PCP: NEGATIVE NG/ML
PM PHENCYCLIDINE INTERP: NEGATIVE
PM PHENOBARBITAL: NEGATIVE NG/ML
PM PPX: NEGATIVE NG/ML
PM PROPOXYPHENE INTERP: NEGATIVE
PM SECOBARBITAL: NEGATIVE NG/ML
PM SUFENTANIL: NEGATIVE NG/ML
PM TAPENTADOL: NEGATIVE NG/ML
PM TEMAZEPAM: 480 NG/ML
PM TRAMADOL INTERP: NEGATIVE
PM TRAMADOL: NEGATIVE NG/ML

## 2023-12-21 ENCOUNTER — APPOINTMENT (OUTPATIENT)
Dept: NEUROLOGY | Facility: CLINIC | Age: 68
End: 2023-12-21
Payer: MEDICARE

## 2023-12-21 PROCEDURE — 92547 SUPPLEMENTAL ELECTRICAL TEST: CPT

## 2023-12-21 PROCEDURE — 92542 POSITIONAL NYSTAGMUS TEST: CPT

## 2023-12-21 PROCEDURE — 92546 SINUSOIDAL ROTATIONAL TEST: CPT

## 2024-01-02 ENCOUNTER — APPOINTMENT (OUTPATIENT)
Dept: NEUROLOGY | Facility: CLINIC | Age: 69
End: 2024-01-02
Payer: MEDICARE

## 2024-01-02 VITALS
DIASTOLIC BLOOD PRESSURE: 81 MMHG | BODY MASS INDEX: 29.35 KG/M2 | WEIGHT: 205 LBS | SYSTOLIC BLOOD PRESSURE: 139 MMHG | HEIGHT: 70 IN | HEART RATE: 68 BPM

## 2024-01-02 DIAGNOSIS — I63.81 OTHER CEREBRAL INFARCTION DUE TO OCCLUSION OR STENOSIS OF SMALL ARTERY: ICD-10-CM

## 2024-01-02 PROCEDURE — 99214 OFFICE O/P EST MOD 30 MIN: CPT

## 2024-01-02 NOTE — ASSESSMENT
[FreeTextEntry1] : 68 year old with Lacunar infarcts and possible vertigo.  He will continue to take ASA 81 mg and undergo full cardiac work up as mentioned.  I will provide him with a prescription to attend vestibular therapy and he will f/u in 8 weeks for reevaluation.  If there is any issue he will contact the office.  Swetha Cardenas MS, PA-C Latrell Matthews MD, Supervising Physician

## 2024-01-02 NOTE — HISTORY OF PRESENT ILLNESS
[FreeTextEntry1] : ORIGINAL PRESENTATION:  Mr. Smith is a 68-year-old male who presents today in neurologic consultation after being referred by Dr. Eaton and Dr. Lobato for symptoms of vertigo, dizziness, and lightheadedness, which follows the vertigo. This started 10/17/23 and is precipitated by turning his head quickly to one side or the other. This is also accompanied by headaches. Headaches have begun to dissipate after 2 weeks. Now, his primary concern is lightheadedness and dizziness. Patient states he can touch the left side of his head in the temple area which causes spinning. He takes Advil almost daily for the headaches up until last week.   Of note is the fact that patient saw me for these symptoms in 2014. At that time, he had episodic lightheadedness and dizziness, but not rotational vertigo. He then had motion precipitated symptoms. We did MRI of the brain and EEG which were normal. The star walking test revealed that the patient marched forward and off to the left.  TODAY:  I had the pleasure of seeing Mr. Smith accompanied by his wife today in follow up. HIs previous history and physical findings have been reviewed.  He is under our care for vertigo, CVA, and headaches which are chronic conditions he is receiving continuing active treatment for.  He underwent VAT/ENG for further evaluation of his complaints and presents today to review the results.  VAT/ENG showed a vestibular issue and he unfortunately does continue to experience dizziness on a daily basis.  He did feel better for about a week but then the dizziness returned.  He states if he turns his head too quickly or bends forward the dizziness will come on.  He is interested in discussing treatment options for it at this time.  In regards to his CVA and high blood pressure he did go to see his cardiologist who stated his BP was not high enough to cause a stroke although it was around 175/100.  He was experiencing severe headaches when it was elevated however, his BP has been better controlled and as a result his headaches are occurring at most once a month.  He has been taking baby aspirin since our last encounter and will be undergoing a full cardiac work up in the next month or two.

## 2024-01-12 ENCOUNTER — APPOINTMENT (OUTPATIENT)
Dept: PAIN MANAGEMENT | Facility: CLINIC | Age: 69
End: 2024-01-12
Payer: OTHER MISCELLANEOUS

## 2024-01-12 VITALS — WEIGHT: 205 LBS | BODY MASS INDEX: 38.71 KG/M2 | HEIGHT: 61 IN

## 2024-01-12 DIAGNOSIS — Z79.899 OTHER LONG TERM (CURRENT) DRUG THERAPY: ICD-10-CM

## 2024-01-12 PROCEDURE — 99214 OFFICE O/P EST MOD 30 MIN: CPT | Mod: ACP

## 2024-01-12 NOTE — DATA REVIEWED
[FreeTextEntry1] :  1. MRI of the lumbosacral spine on 9/2/16 - IMPRESSION: An interval change is evident when comparison is made to the prior study of 2013 in that the previously observed disc herniation paracentral and anterolateral to the left of midline at L5-S1 is no longer evident. An interval change is also appreciated in that there is a small subligamentous disc herniation within the right intravertebral foramen at L2-3 approaching the exiting right second lumbar root far laterally outside the intravertebral foramen, not seen on the prior study. Small diffuse annular bulges are again noted at L4-5, L3-4, and L1-2, unchanged from the prior study.   2. MRI of the left hip on 10/17/16 showed severe osteoarthritis.    SOAPP: Low risk: Patient has combination of a low risk SOAP and no risk factors. UDS would be repeated randomly every quarter.  [place] REGISTRY: Checked

## 2024-01-12 NOTE — ASSESSMENT
[FreeTextEntry1] : This is a 67 yo M who suffered a work-related injury resulting in chronic lumbosacral pain complaints due to degenerative changes. Shoulder pain has been stable. Lumbar pain has not been improving. He wishes to hold off on an updated MRI at this time. We are slowly decreasing his regimen of Percocet with the long-term goal of weaning him off completely. We are decreasing him to Percocet 7.5 mg PO daily today. He will contact me after he speaks to his cardiologist regarding the Clonidine patch. He will follow up in 4 weeks for further evaluation.  I have consulted the  registry for the purpose of reviewing the patient's controlled substance. There are no inconsistencies on urine toxicology screening which would necessitate an alteration of therapy.  Total clinician time spent today on the patient is 30 minutes including preparing to see the patient, obtaining and/or reviewing and confirming history, performing medically necessary and appropriate examination, counseling and educating the patient and/or family, documenting clinical information in the EHR and communicating and/or referring to other healthcare professionals.  DELGADO Montalvo MD

## 2024-01-19 ENCOUNTER — OUTPATIENT (OUTPATIENT)
Dept: OUTPATIENT SERVICES | Facility: HOSPITAL | Age: 69
LOS: 1 days | End: 2024-01-19
Payer: MEDICARE

## 2024-01-19 DIAGNOSIS — R07.9 CHEST PAIN, UNSPECIFIED: ICD-10-CM

## 2024-01-19 DIAGNOSIS — Z00.8 ENCOUNTER FOR OTHER GENERAL EXAMINATION: ICD-10-CM

## 2024-01-19 PROCEDURE — 78452 HT MUSCLE IMAGE SPECT MULT: CPT | Mod: 26

## 2024-01-19 PROCEDURE — 93018 CV STRESS TEST I&R ONLY: CPT

## 2024-01-19 PROCEDURE — 78452 HT MUSCLE IMAGE SPECT MULT: CPT

## 2024-01-19 PROCEDURE — A9500: CPT

## 2024-01-20 DIAGNOSIS — R07.9 CHEST PAIN, UNSPECIFIED: ICD-10-CM

## 2024-01-25 VITALS — OXYGEN SATURATION: 97 % | HEART RATE: 58 BPM | SYSTOLIC BLOOD PRESSURE: 146 MMHG | DIASTOLIC BLOOD PRESSURE: 79 MMHG

## 2024-01-25 NOTE — H&P CARDIOLOGY - PATIENT REFERRED TO
"General Surgery Progress Note    Subjective: No acute issues overnight. Pain well controlled. Denies fevers, CP, SOB.  Reports vomiting earlier this morning due to heartburn.  Voiding spontaneously.  No flatus, BM, positive BS.  Drain OP is 20 mL and bilious.  Objective:   /86 (BP Location: Left arm)  Temp 96.7  F (35.9  C) (Oral)  Resp 16  Ht 1.63 m (5' 4.17\")  Wt 50.3 kg (110 lb 14.4 oz)  SpO2 90%  BMI 18.93 kg/m2    PE:  Gen: Awake, alert, NAD   CV: non-cyanotic  Resp: breathing comfortably on RA  Abd: Soft, non-distended, NTTP, no signs of peritonitis. R drain in place.  Ext: warm and well perfused  Incision: C/D/i    @IO@   Intake/Output Summary (Last 24 hours) at 10/12/18 1228  Last data filed at 10/12/18 0850   Gross per 24 hour   Intake              240 ml   Output             1265 ml   Net            -1025 ml       Recent labs:  Component Value Flag Ref Range Units Status Collected Lab   Sodium 136  133 - 144 mmol/L Final 10/12/2018  7:31 AM 51   Potassium 4.1  3.4 - 5.3 mmol/L Final 10/12/2018  7:31 AM 51   Chloride 95  94 - 109 mmol/L Final 10/12/2018  7:31 AM 51   Carbon Dioxide 33 (H) 20 - 32 mmol/L Final 10/12/2018  7:31 AM 51   Anion Gap 9  3 - 14 mmol/L Final 10/12/2018  7:31 AM 51   Glucose 119 (H) 70 - 99 mg/dL Final 10/12/2018  7:31 AM 51   Urea Nitrogen 15  7 - 30 mg/dL Final 10/12/2018  7:31 AM 51   Creatinine 0.62  0.52 - 1.04 mg/dL Final 10/12/2018  7:31 AM 51   GFR Estimate >90  >60 mL/min/1.7m2 Final 10/12/2018  7:31 AM 51   Comment:   Non  GFR Calc   GFR Estimate If Black >90  >60 mL/min/1.7m2 Final 10/12/2018  7:31 AM 51   Comment:   African American GFR Calc   Calcium 9.6  8.5 - 10.1 mg/dL Final 10/12/2018  7:31 AM 51   Bilirubin Total 1.2  0.2 - 1.3 mg/dL Final 10/12/2018  7:31 AM 51   Albumin 3.2 (L) 3.4 - 5.0 g/dL Final 10/12/2018  7:31 AM 51   Protein Total 7.6  6.8 - 8.8 g/dL Final 10/12/2018  7:31 AM 51   Alkaline Phosphatase 112  40 - 150 U/L Final " 10/12/2018  7:31 AM 51   ALT 90 (H) 0 - 50 U/L Final 10/12/2018  7:31 AM 51   AST 40  0 - 45 U/L Final 10/12/2018  7:31 AM 51     Component Value Flag Ref Range Units Status Collected Lab   WBC 17.1 (H) 4.0 - 11.0 10e9/L Final 10/12/2018  7:31 AM 51   RBC Count 4.94  3.8 - 5.2 10e12/L Final 10/12/2018  7:31 AM 51   Hemoglobin 15.7  11.7 - 15.7 g/dL Final 10/12/2018  7:31 AM 51   Hematocrit 47.8 (H) 35.0 - 47.0 % Final 10/12/2018  7:31 AM 51   MCV 97  78 - 100 fl Final 10/12/2018  7:31 AM 51   MCH 31.8  26.5 - 33.0 pg Final 10/12/2018  7:31 AM 51   MCHC 32.8  31.5 - 36.5 g/dL Final 10/12/2018  7:31 AM 51   RDW 13.7  10.0 - 15.0 % Final 10/12/2018  7:31 AM 51   Platelet Count 288  150 - 450 10e9/L Final 10/12/2018  7:31 AM 51   Diff Method     Final 10/12/2018  7:31 AM 51   Automated Method   % Neutrophils 76.7   % Final 10/12/2018  7:31 AM 51   % Lymphocytes 16.2   % Final 10/12/2018  7:31 AM 51   % Monocytes 6.1   % Final 10/12/2018  7:31 AM 51   % Eosinophils 0.5   % Final 10/12/2018  7:31 AM 51   % Basophils 0.2   % Final 10/12/2018  7:31 AM 51   % Immature Granulocytes 0.3   % Final 10/12/2018  7:31 AM 51   Nucleated RBCs 0  0 /100 Final 10/12/2018  7:31 AM 51   Absolute Neutrophil 13.2 (H) 1.6 - 8.3 10e9/L Final 10/12/2018  7:31 AM 51   Absolute Lymphocytes 2.8  0.8 - 5.3 10e9/L Final 10/12/2018  7:31 AM 51   Absolute Monocytes 1.0  0.0 - 1.3 10e9/L Final 10/12/2018  7:31 AM 51   Absolute Eosinophils 0.1  0.0 - 0.7 10e9/L Final 10/12/2018  7:31 AM 51   Absolute Basophils 0.0  0.0 - 0.2 10e9/L Final 10/12/2018  7:31 AM 51   Abs Immature Granulocytes 0.1  0 - 0.4 10e9/L Final 10/12/2018  7:31 AM 51   Absolute Nucleated RBC 0.0    Final 10/12/2018  7:31 AM 51       Recent imaging reviewed.      NEURO Pain well controlled on scheduled PO Dilaudid and PO Tylenol, IV Dilaudid PRN   CV HDS.    PULM Aggressive pulmonary toilet and I/S.   FEN/GI  Continue current diet     No acute issues, good UOP    HEME Hgb as above.  Postop anemia expected for this surgery.  Continue to monitor. No transfusions indicated at this time   ID Afebrile, no leukocytosis.    Antibiotics: None    ENDO No issues   ACTIVITY Up as tolerated   PPx Prophylactic lovenox   DISPO Home, anticipated d/c to home TBD       A/P: Marlin Harman is a 65 year old female, who is POD # 2  S/p lap. [place] converted to open due to poor exposure secondary to adhesions.  She reports some new acid reflux after starting regular diet yesterday which did not oscar with TUMS.  The pain increased ON and she was prescribed pantoprazole but vomited before it could be administered.  She is now taking Zofran for nausea.  She has reduced her PO intake to include only apple juice.  Drain OP is minimal at 20 mL but seems bilious and therefore should remain for at least 14 days.    Neuro:  -continue pain control    Resp:  -NC PRN  -Albuterol PRN    GI:  -Monitor drain OP  -continue bowel regimen    :  -Chung out today    CV/Heme:  -continue ppx Lovenox    FEN:  -Reg. Diet as tolerated      Dispo:  -Likely tomorrow after BM and resolution of N/V      Seen and discussed with chief resident, Dr. Corbett, who will discuss with staff: Dr. Justice Perez, MS3  General Surgery    Resident Attestation:  I was present with the medical student who participated in the service and in the documentation of this note. I have verified the history and personally performed the physical exam and medical decision making. I have verified the content of the note, which accurately reflects my assessment of the patient and the plan of care with the following additions:  - flatus present on PM rounds  - PPI at 40 TWICE A DAY for heartburn relief  - advance diet as tolerated  -  Keep drain for now, mild bilious output at proximal part. Rest of drain is serosanguinous.    Resident Physician: Reece Walker DO     Cardiac catherization with possible intervention

## 2024-01-25 NOTE — H&P CARDIOLOGY - HISTORY OF PRESENT ILLNESS
Patient is a 68y Male with PMH of lacunar infarction, vertigo                                                                       PSH of                                                                       Family history:        Patient has been having symptoms of............            had + NST 1/19/24 that showed medium defect basal to mid inferior and apical walls and now who presents to the cardiology department for C with possible intervention.     NST 1/19/24  Impression:  -difficult study. There is subdiaphragmatic attenuation artifact  -within limitation of study, there is medium-sized perfusion defect involving basal to mid inferior and apical walls of left ventricle.  On resting images, this defect is partially reversible. These findings are suggestive of attenuation artifact, however, ischemia cannot be excluded  -LVEF 60%  -Left ventricular cavity dilatation    Pre cath note:  indication:  [ ] STEMI                [ ] NSTEMI                 [ ] Acute coronary syndrome                   [ ]Unstable Angina   [ ] high risk  [ ] intermediate risk  [ ] low risk                   [ x] Stable Angina     non-invasive testing:    NST                      Date: 1/19/24                    result: [ ] high risk  [x ] intermediate risk  [ ] low risk    Anti- Anginal medications:                    [ ] not used                       [ ] used                   [ ] not used but strong indication not to use    Ejection Fraction                   [ ] <29            [ ] 30-39%   [ ] 40-49%     [ ]>50%    CHF                   [ ] active (within last 14 days on meds   [ ] Chronic (on meds but no exacerbation)    COPD                   [ ] mild (on chronic bronchodilators)  [ ] moderate (on chronic steroid therapy)      [ ] severe (indication for home O2 or PACO2 >50)    Other risk factors:                     [ ] Previous MI                     [ x] CVA/ stroke                    [ ] carotid stent/ CEA                    [ ] PVD/PAD- (arterial aneurysm, non-palpable pulses, tortuous vessel with inability to insert catheter, infra-renal dissection, renal or subclavian artery stenosis)                    [ ] diabetic                    [ ] previous CABG                    [ ] Renal Failure     Bleeding Risk: 0.9%      RIGHT RADIAL ARTERY EVALUATION:  TEODORA TEST: [] Negative          [] Positive  BARBEAU TEST: [] Class A           [] Class B           [] Class C            [] Class D      EF: 60% per NST     EKG:     Fluids:  Patient is a 68y Male with PMH of lacunar infarction 10/23, vertigo, + FH CAD                                        PSH: hip replacement                                        Family history:  CAD      Patient has been having symptoms of dizziness and HA for several months, pt had OP MRI revealing Lacunar infarct 10/23. Pt  had + NST 1/19/24 that showed medium defect basal to mid inferior and apical walls and now who presents to the cardiology department for C with possible intervention.     NST 1/19/24  Impression:  -difficult study. There is subdiaphragmatic attenuation artifact  -within limitation of study, there is medium-sized perfusion defect involving basal to mid inferior and apical walls of left ventricle.  On resting images, this defect is partially reversible. These findings are suggestive of attenuation artifact, however, ischemia cannot be excluded  -LVEF 60%  -Left ventricular cavity dilatation    Pre cath note:  indication:  [ ] STEMI                [ ] NSTEMI                 [ ] Acute coronary syndrome                   [ ]Unstable Angina   [ ] high risk  [ ] intermediate risk  [ ] low risk                   [ x] Stable Angina     non-invasive testing:    NST                      Date: 1/19/24                    result: [ ] high risk  [x ] intermediate risk  [ ] low risk    Anti- Anginal medications:                    [ ] not used                       [x ] used  (CCB, nitrate)                 [ ] not used but strong indication not to use    Ejection Fraction                   [ ] <29            [ ] 30-39%   [ ] 40-49%     [ x]>50%    CHF                   [ ] active (within last 14 days on meds   [ ] Chronic (on meds but no exacerbation)    COPD                   [ ] mild (on chronic bronchodilators)  [ ] moderate (on chronic steroid therapy)      [ ] severe (indication for home O2 or PACO2 >50)    Other risk factors:                     [ ] Previous MI                     [ x] CVA/ stroke                    [ ] carotid stent/ CEA                    [ ] PVD/PAD- (arterial aneurysm, non-palpable pulses, tortuous vessel with inability to insert catheter, infra-renal dissection, renal or subclavian artery stenosis)                    [ ] diabetic                    [ ] previous CABG                    [ ] Renal Failure     Bleeding Risk: 0.9%      RIGHT RADIAL ARTERY EVALUATION:  TEODORA TEST: [] Negative          [x] Positive      EF: 60% per NST on 1/19/2024    EKG: SR 1 block, RBBB, PVC on 1/26/24    Fluids:  CC iv bolus x 1 over 1 hr precath hydration

## 2024-01-25 NOTE — H&P CARDIOLOGY - RESPIRATORY AND THORAX
"[facility]  Neonatology  Progress Note    Patient Name: Yusuf Ramos  MRN: [id number]  Admission Date: 2022  Hospital Length of Stay: 16 days  Attending Physician: Kayce Palumbo MD    At Birth Gestational Age: 29w2d  Corrected Gestational Age 31w 4d  Chronological Age: 2 wk.o.    Subjective: Former ELBW      Interval History:No events     Scheduled Meds:   caffeine citrate  9.6 mg Per OG tube Daily    cholecalciferol (vitamin D3)  200 Units Oral Daily    [START ON 2022] pediatric multivitamin with iron  0.5 mL Oral Daily     Continuous Infusions:  PRN Meds:    Nutritional Support: Enteral: Breast milk 24 KCal    Objective:     Vital Signs (Most Recent):  Temp: 99 °F (37.2 °C) (09/13/22 1400)  Pulse: (!) 168 (09/13/22 1400)  Resp: 62 (09/13/22 1400)  BP: (!) 64/40 (09/13/22 0800)  SpO2: 96 % (09/13/22 1400)   Vital Signs (24h Range):  Temp:  [98 °F (36.7 °C)-99.1 °F (37.3 °C)] 99 °F (37.2 °C)  Pulse:  [160-199] 168  Resp:  [26-79] 62  SpO2:  [93 %-100 %] 96 %  BP: (64)/(40) 64/40     Anthropometrics:  Head Circumference: 25.3 cm  Weight: 1110 g (2 lb 7.2 oz) 6 %ile (Z= -1.52) based on [place] (Boys, 22-50 Weeks) weight-for-age data using vitals from 2022.  Height: 35 cm (13.78") <1 %ile (Z= -2.36) based on Joanna (Boys, 22-50 Weeks) Length-for-age data based on Length recorded on 2022.    Intake/Output - Last 3 Shifts         09/11 0700  09/12 0659 09/12 0700 09/13 0659 09/13 0700 09/14 0659    NG/ 168 67    Total Intake(mL/kg) 167 (156.1) 168 (151.4) 67 (60.4)    Urine (mL/kg/hr) 113 (4.4) 112 (4.2) 38 (4.2)    Emesis/NG output 0      Stool 0 0 0    Total Output 113 112 38    Net +54 +56 +29           Urine Occurrence 1 x      Stool Occurrence 5 x 2 x 1 x    Emesis Occurrence 1 x              Physical Exam  Vitals reviewed.   Constitutional:       General: He is active.      Appearance: Normal appearance. He is well-developed.   HENT:      Head: Normocephalic. Anterior " fontanelle is full.      Right Ear: External ear normal.      Left Ear: External ear normal.      Nose: Nose normal.      Mouth/Throat:      Mouth: Mucous membranes are moist.      Pharynx: Oropharynx is clear.   Eyes:      Pupils: Pupils are equal, round, and reactive to light.   Cardiovascular:      Rate and Rhythm: Normal rate and regular rhythm.      Pulses: Normal pulses.      Heart sounds: No murmur heard.  Pulmonary:      Effort: Pulmonary effort is normal.      Breath sounds: Normal breath sounds.   Abdominal:      General: Abdomen is flat. Bowel sounds are normal.      Palpations: Abdomen is soft.   Musculoskeletal:         General: Normal range of motion.      Cervical back: Normal range of motion.   Skin:     General: Skin is warm.      Capillary Refill: Capillary refill takes less than 2 seconds.      Turgor: Normal.   Neurological:      General: No focal deficit present.      Mental Status: He is alert.      Primitive Reflexes: Suck normal. Symmetric [place].       Ventilator Data (Last 24H):     Oxygen Concentration (%):  [21] 21    No results for input(s): PH, PCO2, PO2, HCO3, POCSATURATED, BE in the last 72 hours.     Lines/Drains:  Lines/Drains/Airways       Drain  Duration                  NG/OG Tube 08/28/22 1715 5 Fr. Center mouth 15 days                      Laboratory:  CBC:   Lab Results   Component Value Date    WBC 4.50 (L) 2022    RBC 3.73 (L) 2022    HGB 16.0 2022    HCT 45.5 2022     (H) 2022    MCH 42.9 (H) 2022    MCHC 35.2 2022    RDW 21.1 (H) 2022     (L) 2022    MPV 10.5 2022    GRAN 48.0 2022    LYMPH 40.0 2022    MONO 8.0 2022    EOS CANCELED 2022    BASO CANCELED 2022    EOSINOPHIL 3.0 2022    BASOPHIL 0.0 (L) 2022     BMP: No results for input(s): GLU, NA, K, CL, CO2, BUN, CREATININE, CALCIUM in the last 24 hours.  CMP: No results for input(s): GLU, CALCIUM, ALBUMIN,  PROT, NA, K, CO2, CL, BUN, CREATININE, ALKPHOS, ALT, AST, BILITOT in the last 24 hours.    Diagnostic Results:  US: Reviewed      Assessment/Plan:     Pulmonary  Apnea of prematurity  COMMENTS:  Remains on caffeine therapy. No episodes reported over the last 24 hours.     PLANS:  - Continue caffeine  - Follow clinically    Respiratory distress syndrome in   COMMENTS:  Remains on 3LPM Vapotherm without supplemental oxygen requirement. Comfortable work of breathing on exam.  9/8 AM blood gas without respiratory acidosis.     PLANS:  - Continue current support and allow for growth on current support until ~32weeks CGA  - Monitor work of breathing and FiO2 requirements  - Follow CBGs once a week (next )    GI  Hyperbilirubinemia requiring phototherapy  COMMENTS:  Mom A+, indirect Poonam negative. Infant O+, direct Poonam negative.  Remains on single phototherapy. Bili 9/10 5.5 well below light level    PLANS:  Follow as needed.       Obstetric  * Prematurity, 1,000-1,249 grams, 29-30 completed weeks  COMMENTS:  15 days old, corrected to 31 4/7 weeks gestational age. Euthermic in isolette on ISC control. Remains on multivitamin supplementation.     PLANS:  - Provide developmentally supportive care as tolerated  - Continue multivitamin therapy  - Follow hematology labs on   - Follow growth velocity      Orthopedic  Osteopenia of prematurity  COMMENTS:  Upward trend in alkaline phosphate, most recently 558 (on ). Tolerating full enteral feeds. Vitamin D supplementation initiated on .     PLANS:  - Maximize nutrition as able  - Continue vitamin D supplementation  - Follow alkaline phosphate on weekly nutrition labs, due  - ordered.     Other  Feeding problem in infant  COMMENTS:  Gained 50 gm overnight. Tolerating q3 hour gavage feeds of maternal EBM fortified to 24cal/oz. Has regained birthweight.     PLANS:  -Optimize energy intake to promote growth  -Follow growth velocity     Healthcare  maintenance  SOCIAL COMMENTS:  - 9/4: Mom updated at bedside by NNP    SCREENING PLANS:  - Hearing screen  - Car seat test  - NBS on DOL 28 or prior to discharge  - CUS at DOL 30  - ROP at 33wks CGA    Immunizations:  Hepatitis B - due at 30 days    COMPLETED:  - 8/31: NBS - pending MPS, POMPE, SMA. All other results normal.   - 9/2: CUS normal           Kayce Palumbo MD  Neonatology  [facility]   negative

## 2024-01-25 NOTE — H&P CARDIOLOGY - COMMENTS
Risks and possible complications of the procedure were fully discussed with the patient and his wife

## 2024-01-26 ENCOUNTER — OUTPATIENT (OUTPATIENT)
Dept: OUTPATIENT SERVICES | Facility: HOSPITAL | Age: 69
LOS: 1 days | Discharge: HOME | End: 2024-01-26
Payer: MEDICARE

## 2024-01-26 VITALS
RESPIRATION RATE: 16 BRPM | HEART RATE: 56 BPM | OXYGEN SATURATION: 96 % | DIASTOLIC BLOOD PRESSURE: 59 MMHG | SYSTOLIC BLOOD PRESSURE: 135 MMHG

## 2024-01-26 DIAGNOSIS — R06.02 SHORTNESS OF BREATH: ICD-10-CM

## 2024-01-26 DIAGNOSIS — I10 ESSENTIAL (PRIMARY) HYPERTENSION: ICD-10-CM

## 2024-01-26 DIAGNOSIS — I25.110 ATHEROSCLEROTIC HEART DISEASE OF NATIVE CORONARY ARTERY WITH UNSTABLE ANGINA PECTORIS: ICD-10-CM

## 2024-01-26 DIAGNOSIS — E78.5 HYPERLIPIDEMIA, UNSPECIFIED: ICD-10-CM

## 2024-01-26 DIAGNOSIS — Z96.642 PRESENCE OF LEFT ARTIFICIAL HIP JOINT: Chronic | ICD-10-CM

## 2024-01-26 LAB
ANION GAP SERPL CALC-SCNC: 12 MMOL/L — SIGNIFICANT CHANGE UP (ref 7–14)
BUN SERPL-MCNC: 15 MG/DL — SIGNIFICANT CHANGE UP (ref 10–20)
CALCIUM SERPL-MCNC: 9.6 MG/DL — SIGNIFICANT CHANGE UP (ref 8.4–10.5)
CHLORIDE SERPL-SCNC: 102 MMOL/L — SIGNIFICANT CHANGE UP (ref 98–110)
CO2 SERPL-SCNC: 25 MMOL/L — SIGNIFICANT CHANGE UP (ref 17–32)
CREAT SERPL-MCNC: 1.1 MG/DL — SIGNIFICANT CHANGE UP (ref 0.7–1.5)
EGFR: 74 ML/MIN/1.73M2 — SIGNIFICANT CHANGE UP
GLUCOSE SERPL-MCNC: 92 MG/DL — SIGNIFICANT CHANGE UP (ref 70–99)
HCT VFR BLD CALC: 43.7 % — SIGNIFICANT CHANGE UP (ref 42–52)
HGB BLD-MCNC: 15.1 G/DL — SIGNIFICANT CHANGE UP (ref 14–18)
MCHC RBC-ENTMCNC: 30.9 PG — SIGNIFICANT CHANGE UP (ref 27–31)
MCHC RBC-ENTMCNC: 34.6 G/DL — SIGNIFICANT CHANGE UP (ref 32–37)
MCV RBC AUTO: 89.5 FL — SIGNIFICANT CHANGE UP (ref 80–94)
NRBC # BLD: 0 /100 WBCS — SIGNIFICANT CHANGE UP (ref 0–0)
PLATELET # BLD AUTO: 167 K/UL — SIGNIFICANT CHANGE UP (ref 130–400)
PMV BLD: 9.2 FL — SIGNIFICANT CHANGE UP (ref 7.4–10.4)
POTASSIUM SERPL-MCNC: 4.1 MMOL/L — SIGNIFICANT CHANGE UP (ref 3.5–5)
POTASSIUM SERPL-SCNC: 4.1 MMOL/L — SIGNIFICANT CHANGE UP (ref 3.5–5)
RBC # BLD: 4.88 M/UL — SIGNIFICANT CHANGE UP (ref 4.7–6.1)
RBC # FLD: 12.8 % — SIGNIFICANT CHANGE UP (ref 11.5–14.5)
SODIUM SERPL-SCNC: 139 MMOL/L — SIGNIFICANT CHANGE UP (ref 135–146)
WBC # BLD: 5.84 K/UL — SIGNIFICANT CHANGE UP (ref 4.8–10.8)
WBC # FLD AUTO: 5.84 K/UL — SIGNIFICANT CHANGE UP (ref 4.8–10.8)

## 2024-01-26 PROCEDURE — C1887: CPT

## 2024-01-26 PROCEDURE — 93458 L HRT ARTERY/VENTRICLE ANGIO: CPT

## 2024-01-26 PROCEDURE — 93010 ELECTROCARDIOGRAM REPORT: CPT

## 2024-01-26 PROCEDURE — C1894: CPT

## 2024-01-26 PROCEDURE — 80048 BASIC METABOLIC PNL TOTAL CA: CPT

## 2024-01-26 PROCEDURE — 36415 COLL VENOUS BLD VENIPUNCTURE: CPT

## 2024-01-26 PROCEDURE — 85027 COMPLETE CBC AUTOMATED: CPT

## 2024-01-26 PROCEDURE — C1769: CPT

## 2024-01-26 PROCEDURE — 93005 ELECTROCARDIOGRAM TRACING: CPT

## 2024-01-26 RX ORDER — SODIUM CHLORIDE 9 MG/ML
250 INJECTION INTRAMUSCULAR; INTRAVENOUS; SUBCUTANEOUS ONCE
Refills: 0 | Status: COMPLETED | OUTPATIENT
Start: 2024-01-26 | End: 2024-01-26

## 2024-01-26 RX ORDER — AMLODIPINE BESYLATE 2.5 MG/1
2.5 TABLET ORAL ONCE
Refills: 0 | Status: COMPLETED | OUTPATIENT
Start: 2024-01-26 | End: 2024-01-26

## 2024-01-26 RX ORDER — ISOSORBIDE MONONITRATE 60 MG/1
15 TABLET, EXTENDED RELEASE ORAL ONCE
Refills: 0 | Status: COMPLETED | OUTPATIENT
Start: 2024-01-26 | End: 2024-01-26

## 2024-01-26 RX ORDER — ASPIRIN/CALCIUM CARB/MAGNESIUM 324 MG
0 TABLET ORAL
Refills: 0 | DISCHARGE

## 2024-01-26 RX ORDER — SODIUM CHLORIDE 9 MG/ML
300 INJECTION INTRAMUSCULAR; INTRAVENOUS; SUBCUTANEOUS
Refills: 0 | Status: DISCONTINUED | OUTPATIENT
Start: 2024-01-26 | End: 2024-01-26

## 2024-01-26 RX ORDER — CHLORHEXIDINE GLUCONATE 213 G/1000ML
1 SOLUTION TOPICAL ONCE
Refills: 0 | Status: DISCONTINUED | OUTPATIENT
Start: 2024-01-26 | End: 2024-01-26

## 2024-01-26 RX ORDER — ROSUVASTATIN CALCIUM 5 MG/1
1 TABLET ORAL
Refills: 0 | DISCHARGE

## 2024-01-26 RX ORDER — OXYCODONE AND ACETAMINOPHEN 5; 325 MG/1; MG/1
1 TABLET ORAL
Refills: 0 | DISCHARGE

## 2024-01-26 RX ADMIN — SODIUM CHLORIDE 250 MILLILITER(S): 9 INJECTION INTRAMUSCULAR; INTRAVENOUS; SUBCUTANEOUS at 07:18

## 2024-01-26 RX ADMIN — SODIUM CHLORIDE 100 MILLILITER(S): 9 INJECTION INTRAMUSCULAR; INTRAVENOUS; SUBCUTANEOUS at 08:30

## 2024-01-26 RX ADMIN — ISOSORBIDE MONONITRATE 15 MILLIGRAM(S): 60 TABLET, EXTENDED RELEASE ORAL at 07:18

## 2024-01-26 RX ADMIN — AMLODIPINE BESYLATE 2.5 MILLIGRAM(S): 2.5 TABLET ORAL at 07:17

## 2024-01-26 NOTE — ASU DISCHARGE PLAN (ADULT/PEDIATRIC) - ASU DC SPECIAL INSTRUCTIONSFT
Activity:  - Do not drive or operate heavy machinery for 24 hours.  - Limit your physical or any strenuous activity for 2 weeks after angioplasty and 48 hours for angiogram. Support the groin site with your hand when you sneeze or cough. No heavy lifting ( objects more then 10 pounds).  - For wrist access, avoid using affected arm for 24 hours after removal of dressing and avoid heavy lifting for 7 days.  Hygiene:  - After 24 hours, you may shower and remove the dressing from the site. Do not tub bathe for one week. Do not rub or apply lotion, cream, powder to the affected site. Leave it open to air.   Diet:   - You may resume your diet. Low Sodium. Low Fat, Low Cholesterol.  If Diabetic - Carbohydrate Consistent Diet.      - Drink extra fluid unless otherwise advised.   Special Instructions:  - Bruising or black and blue at the puncture site is possible.  - If there is bleeding from the puncture site (groin or wrist) apply direct firm pressure on the site and call 911.  - Any sudden swelling, redness, fever, discharge or severe pain, call your physician or call the cath lab.   - If you notice any scab formation in the area avoid touching the site and allow it to heal.  - Numbness or "pins and needle" sensation in the affected arm, hand, leg or if the affected site become cool to touch or pale that persist for extended period of time call your physician immediately to be checked.  - If you developed chest pain, not relieved by your usual routine medication, fainting, lethargy, weakness, report to the nearest emergency room.   - Inform your Dentist or Surgeon if you are taking Aspirin or any antiplatelet medications. Report any bleeding in your urine or stool.   Medications:  - Soreness or tenderness at the site is possible it will diminish over time. You may take Tylenol every 4-6 hours as needed. Nothing stronger is needed.  - If you are diabetic and taking medication containing Metformin, do not take them for 48 hours after the procedure.     Any questions call Cardiac Cath Lab at [phone] or [phone], Monday - Friday from 7 - 9 pm.

## 2024-01-26 NOTE — ASU DISCHARGE PLAN (ADULT/PEDIATRIC) - NS MD DC FALL RISK RISK
For information on Fall & Injury Prevention, visit: [email or web address] OR  [email or web address] OR  https://www.cdc.gov/steadi/patient.html

## 2024-01-26 NOTE — CHART NOTE - NSCHARTNOTEFT_GEN_A_CORE
PROCEDURE:   [X] [facility]     PHYSICIAN:  Dr. Stephens  FELLOW: Dr. Ibarra    Pre-procedure Diagnosis: Chest Pain    Consent:    [X] Patient      Anesthesia:   [X] Sedation   [X] Local     Access & Closure:   [X] 6 Fr R Radial Artery  -> D-stat    IV Contrast: 100 mL      Intervention: N/A    Implants: N/A    AUC: N/A     FINDINGS:     Coronary Dominance: Left    LM: Minor luminal irregularities.     LAD: Moderate disease proximally; not significant by FFR angio (0.84)  D1: Minor luminal irregularities.     CX: Minor luminal irregularities.   OM1: Minor luminal irregularities.     Ramus: Moderate disease proximally    RCA: Non-dominant. Minor luminal irregularities.     LVEDP: 15 mmHg     EF: 60 % (LV gram)    AV gradient: No significant gradient      ESTIMATED BLOOD LOSS: < 10 mL      CONDITION:   [X] Good     SPECIMEN REMOVED: N/A     POST-OP DIAGNOSIS:    [X] Mild Coronary Artery Disease (< 50% stenosis)      PLAN OF CARE:   [X] D/C Home Today   [X] Medications:   - Continue same meds  [X] IV Fluids: NS @ 100cc/h x 4 hours  [] EP Consult  [x] Remove D-stat. Hold manual pressure if signs of hematoma or bleeding over radial access site.

## 2024-01-26 NOTE — ASU DISCHARGE PLAN (ADULT/PEDIATRIC) - CARE PROVIDER_API CALL
John Stephens  Interventional Cardiology  [address] [place] [id number]  Phone: [phone]  Fax: [phone]  Follow Up Time: 2 weeks

## 2024-01-29 PROBLEM — I63.81 OTHER CEREBRAL INFARCTION DUE TO OCCLUSION OR STENOSIS OF SMALL ARTERY: Chronic | Status: ACTIVE | Noted: 2024-01-25

## 2024-01-29 PROBLEM — R42 DIZZINESS AND GIDDINESS: Chronic | Status: ACTIVE | Noted: 2024-01-25

## 2024-02-06 ENCOUNTER — APPOINTMENT (OUTPATIENT)
Dept: NEUROLOGY | Facility: CLINIC | Age: 69
End: 2024-02-06
Payer: MEDICARE

## 2024-02-06 VITALS
BODY MASS INDEX: 29.35 KG/M2 | WEIGHT: 205 LBS | HEIGHT: 70 IN | HEART RATE: 78 BPM | DIASTOLIC BLOOD PRESSURE: 90 MMHG | SYSTOLIC BLOOD PRESSURE: 140 MMHG

## 2024-02-06 DIAGNOSIS — H81.90 UNSPECIFIED DISORDER OF VESTIBULAR FUNCTION, UNSPECIFIED EAR: ICD-10-CM

## 2024-02-06 DIAGNOSIS — R42 DIZZINESS AND GIDDINESS: ICD-10-CM

## 2024-02-06 PROCEDURE — 99214 OFFICE O/P EST MOD 30 MIN: CPT

## 2024-02-06 NOTE — HISTORY OF PRESENT ILLNESS
[FreeTextEntry1] : Mr. Smith is a 68-year-old male who presents today in neurologic follow up accompanied by his daughter. They are seeing me today to indicate their apprehensions of being weaned off narcotics that pain management has had him on for years. I did review the MRI of the brain with the patient which revealed 2 small strokes in the right frontal region, one old and one recent. Patient initially saw me for symptoms of vertigo, dizziness, and lightheadedness, which follows the vertigo. This started 10/17/23 and is precipitated by turning his head quickly to one side or the other. This is also accompanied by headaches. Headaches have begun to dissipate after 2 weeks. Now, his primary concern is lightheadedness and dizziness. Patient states he can touch the left side of his head in the temple area which causes spinning. VAT/ENG showed a vestibular issue and he unfortunately does continue to experience dizziness on a daily basis. His blood pressure today was 140/90 which is technically high blood pressure.   Of note is the fact that patient saw me for these symptoms in 2014. At that time, he had episodic lightheadedness and dizziness, but not rotational vertigo. He then had motion precipitated symptoms. We did MRI of the brain and EEG which were normal. The star walking test revealed that the patient marched forward and off to the left.

## 2024-02-06 NOTE — ASSESSMENT
[FreeTextEntry1] : Impression is that of vestibular dysfunction causing his vertigo. I recommended he discuss with his cardiology whether he should be started on a beta blocker or diuretic for his high blood pressure. He should also convert to a vegetarian type diet and exercise daily. He should keep control over his blood pressure, blood cholesterol, blood sugar, and weight. As for his concerns of his narcotic medications, patient was advised to discuss this further with his pain management provider.     Entered by Navya Murray acting as scribe for Dr. Matthews.   The documentation recorded by the scribe, in my presence, accurately reflects the service I personally performed, and the decisions made by me with my edits as appropriate. Laterll Matthews MD, FAAN, FACP Diplomate American Board of Psychiatry & Neurology.

## 2024-02-12 ENCOUNTER — APPOINTMENT (OUTPATIENT)
Dept: PAIN MANAGEMENT | Facility: CLINIC | Age: 69
End: 2024-02-12
Payer: OTHER MISCELLANEOUS

## 2024-02-12 DIAGNOSIS — M51.26 OTHER INTERVERTEBRAL DISC DISPLACEMENT, LUMBAR REGION: ICD-10-CM

## 2024-02-12 DIAGNOSIS — G89.29 PAIN IN LEFT SHOULDER: ICD-10-CM

## 2024-02-12 DIAGNOSIS — M54.16 RADICULOPATHY, LUMBAR REGION: ICD-10-CM

## 2024-02-12 DIAGNOSIS — M25.512 PAIN IN LEFT SHOULDER: ICD-10-CM

## 2024-02-12 DIAGNOSIS — Z79.891 LONG TERM (CURRENT) USE OF OPIATE ANALGESIC: ICD-10-CM

## 2024-02-12 DIAGNOSIS — G89.29 OTHER CHRONIC PAIN: ICD-10-CM

## 2024-02-12 PROCEDURE — 99214 OFFICE O/P EST MOD 30 MIN: CPT | Mod: ACP

## 2024-02-12 RX ORDER — CLONIDINE 0.2 MG/24H
0.2 PATCH, EXTENDED RELEASE TRANSDERMAL
Qty: 4 | Refills: 1 | Status: ACTIVE | COMMUNITY
Start: 2024-02-12 | End: 1900-01-01

## 2024-02-12 RX ORDER — OXYCODONE AND ACETAMINOPHEN 5; 325 MG/1; MG/1
5-325 TABLET ORAL DAILY
Qty: 30 | Refills: 0 | Status: ACTIVE | COMMUNITY
Start: 2022-07-12 | End: 1900-01-01

## 2024-02-12 NOTE — DISCUSSION/SUMMARY
[Medication Risks Reviewed] : Medication risks reviewed [de-identified] : This is a 67 yo M who suffered a work-related injury resulting in chronic lumbosacral pain complaints due to degenerative changes. Shoulder pain has been stable. Lumbar pain has not been improving. He wishes to hold off on an updated MRI at this time. We are slowly decreasing his regimen of Percocet with the long-term goal of weaning him off completely. We are decreasing him to Percocet 5/325 mg PO daily today and we will add Clonidine patch to prevent withdrawals. He will follow up in 8 weeks for further evaluation.  I have consulted the  registry for the purpose of reviewing the patient's controlled substance. There are no inconsistencies on urine toxicology screening which would necessitate an alteration of therapy.  Total clinician time spent today on the patient is 30 minutes including preparing to see the patient, obtaining and/or reviewing and confirming history, performing medically necessary and appropriate examination, counseling and educating the patient and/or family, documenting clinical information in the EHR and communicating and/or referring to other healthcare professionals.  DELGADO Avila MD

## 2024-02-12 NOTE — HISTORY OF PRESENT ILLNESS
[FreeTextEntry1] : HISTORY OF PRESENT ILLNESS: As a review, Mr. Andrew Smith is a 68-year-old former  who states he was lifting a large piece of granite back on July 21, 2008, injuring his lower back and left shoulder. He states that he worked for another two years and ended up retiring in January 2010. He states that his left shoulder pain improved, however, he is having persistent lower back pain which radiates down his left leg and is associated with paresthesias and dysesthesias. He describes his pain as severe, constant, 100% of the time, in no particular pattern, burning, sharp, shooting, dull, and aching with numbness in the lower extremities.  TODAY:  Last seen on 01/12/2024 and since then there has been no new complaints or acute changes. He presents for a revisit. He is under our care for his chronic lower back pain and shoulder pain following a work-related injury on July 21, 2008. Shoulder pain has been stable. As for his lumbar pain, he continues to experience stiffness and tightness along the middle of his spine. Bending down aggravates the pain. Alleviated by rest and ambulation. He denies any radiation of pain down his legs. Patient has wanted to hold off on an updated lumbar MRI to assess for interval change.   On his last visit, the patient's Percocet was decreased from 7.5 mg from  BID to QD. He states he has been having withdrawal symptoms over the past month. He admits to difficulty sleeping and flu-like symptoms occasionally. A Clonidine patch for withdrawals will be prescribed today.  Today, we will continue to decrease his Percocet to 5/325mg QD and it is recommended to gradually wean off by the next visit completely.  UDS: 12/20/23 - Consistent.

## 2024-03-12 ENCOUNTER — APPOINTMENT (OUTPATIENT)
Dept: NEUROLOGY | Facility: CLINIC | Age: 69
End: 2024-03-12

## 2024-04-10 ENCOUNTER — APPOINTMENT (OUTPATIENT)
Dept: PAIN MANAGEMENT | Facility: CLINIC | Age: 69
End: 2024-04-10

## 2024-08-13 ENCOUNTER — APPOINTMENT (OUTPATIENT)
Dept: OTOLARYNGOLOGY | Facility: CLINIC | Age: 69
End: 2024-08-13

## 2024-08-13 DIAGNOSIS — H93.19 TINNITUS, UNSPECIFIED EAR: ICD-10-CM

## 2024-08-13 DIAGNOSIS — H90.3 SENSORINEURAL HEARING LOSS, BILATERAL: ICD-10-CM

## 2024-08-13 DIAGNOSIS — H81.90 UNSPECIFIED DISORDER OF VESTIBULAR FUNCTION, UNSPECIFIED EAR: ICD-10-CM

## 2024-08-13 DIAGNOSIS — R42 DIZZINESS AND GIDDINESS: ICD-10-CM

## 2024-08-13 DIAGNOSIS — J34.89 OTHER SPECIFIED DISORDERS OF NOSE AND NASAL SINUSES: ICD-10-CM

## 2024-08-13 DIAGNOSIS — R51.9 HEADACHE, UNSPECIFIED: ICD-10-CM

## 2024-08-13 PROCEDURE — 92557 COMPREHENSIVE HEARING TEST: CPT

## 2024-08-13 PROCEDURE — 99204 OFFICE O/P NEW MOD 45 MIN: CPT | Mod: 25

## 2024-08-13 PROCEDURE — 31231 NASAL ENDOSCOPY DX: CPT

## 2024-08-13 PROCEDURE — 92550 TYMPANOMETRY & REFLEX THRESH: CPT | Mod: 52

## 2024-08-13 NOTE — PHYSICAL EXAM
[Normal] : mucosa is normal [Midline] : trachea located in midline position [] : [place] test is negative

## 2024-08-13 NOTE — ASSESSMENT
[FreeTextEntry1] : I reviewed, interpreted, and discussed the Audiogram done today. Bilateral SNHL.   Multidirectional nystagmus.  MRI results from Dec 2023 reviewed.   Continue neti-pot irrigation.

## 2024-08-13 NOTE — HISTORY OF PRESENT ILLNESS
[de-identified] : Patient presents for dizziness. Symptoms have been ongoing for 10 months everyday. Having ringing in ears.  When turning head or looking up, he has dizziness, lightheadedness, and imbalance. Denies room spinning. Feels like weather affects symptoms. Tried meclizine in the past without improvement of symptoms.  C/o of sinus headaches, pressure in eyes that started 3 weeks ago, using netti pot. In the morning, having increased mucus in throat and PND. Feels pulsating behind eyes. Denies nasal congestion.

## 2024-08-13 NOTE — PROCEDURE
[Anterior rhinoscopy insufficient to account for symptoms] : anterior rhinoscopy insufficient to account for symptoms [Flexible Endoscope] : examined with the flexible endoscope [Congested] : congested [Deviated to the Lt] : deviated to the left [Normal] : the middle meatus had no abnormalities

## 2024-08-21 ENCOUNTER — RESULT REVIEW (OUTPATIENT)
Age: 69
End: 2024-08-21

## 2024-09-11 ENCOUNTER — APPOINTMENT (OUTPATIENT)
Dept: OTOLARYNGOLOGY | Facility: CLINIC | Age: 69
End: 2024-09-11
Payer: MEDICARE

## 2024-09-11 VITALS — BODY MASS INDEX: 29.35 KG/M2 | WEIGHT: 205 LBS | HEIGHT: 70 IN

## 2024-09-11 DIAGNOSIS — H81.90 UNSPECIFIED DISORDER OF VESTIBULAR FUNCTION, UNSPECIFIED EAR: ICD-10-CM

## 2024-09-11 DIAGNOSIS — H93.19 TINNITUS, UNSPECIFIED EAR: ICD-10-CM

## 2024-09-11 DIAGNOSIS — R42 DIZZINESS AND GIDDINESS: ICD-10-CM

## 2024-09-11 DIAGNOSIS — H90.3 SENSORINEURAL HEARING LOSS, BILATERAL: ICD-10-CM

## 2024-09-11 PROCEDURE — 99214 OFFICE O/P EST MOD 30 MIN: CPT

## 2024-09-11 NOTE — ASSESSMENT
[FreeTextEntry1] : Patient to continue saline irrigation.  Flonase as needed.  I personally reviewed, interpreted and discussed patient's MRI images. No retrocochlear abnormality.  Patient feeling better.

## 2024-09-11 NOTE — PHYSICAL EXAM
[Normal] : mucosa is normal [Midline] : trachea located in midline position [de-identified] : Tympanogram Type C 1. 11 mL

## 2024-09-11 NOTE — HISTORY OF PRESENT ILLNESS
[FreeTextEntry1] : Patient returns today c/o dizziness, tinnitus States that he is still experiencing fogginess in head and ringing in ears. Sinus headaches and sinus pressure has subsided. Believes it was due to pollen. Did not schedule to see a Neurologist. MRI 8/21/24. Here to discuss results. No on any nasal sprays or allergy medication. Has been using nasal saline rinse with improvement. Has not gone for VNG. Will feel very unbalanced but no sensation that the room is spinning. Will have episode when he turns in bed. No change in hearing since last visit.

## 2024-09-11 NOTE — DATA REVIEWED
[de-identified] :  EXAM:  MR IAC ONLY [facility] IC   ORDERED BY: JENNY VIDAL  ACC: [id number]     EXAM:  MR BRAIN [facility] IC   ORDERED BY: JENNY VIDAL  PROCEDURE DATE:  08/21/2024    INTERPRETATION:  CLINICAL INDICATION: Hearing loss.  TECHNIQUE: Multi-planar multi-sequential MR imaging of the brain with special attention to the internal auditory canals was performed before and after the intravenous administration of contrast. 8.5 ml of Gadavist  was administered.  COMPARISON: None available  FINDINGS:  There are scattered focal T2/FLAIR hyperintensities in bilateral subcortical cerebral white matter, consistent with mild chronic microvascular ischemic changes.  Evaluation of the internal auditory canals and cerebellopontine angle cisterns reveals no mass and no abnormal enhancement. The cisternal and canalicular portions of the seventh and eighth cranial nerves are normal. The bilateral vestibulocochlear complexes appear intact.  No acute infarction, intracranial hemorrhage or intraparenchymal mass lesion. No abnormal intraparenchymal enhancement is identified.  No hydrocephalus. No extra-axial fluid collections. The skull base flow voids are present.  The visualized intraorbital contents are normal. The imaged portions of the paranasal sinuses are clear. The mastoid air cells are clear. The other visualized soft tissues and osseous structures appear normal.   IMPRESSION:  Unremarkable appearance of the internal auditory canals, cranial nerve VII/VIII complexes, and vestibulocochlear structures. No retrocochlear mass.  Mild chronic microvascular ischemic changes.  --- End of Report ---

## 2024-11-25 ENCOUNTER — APPOINTMENT (OUTPATIENT)
Dept: OTOLARYNGOLOGY | Facility: CLINIC | Age: 69
End: 2024-11-25
Payer: MEDICARE

## 2024-11-25 ENCOUNTER — NON-APPOINTMENT (OUTPATIENT)
Age: 69
End: 2024-11-25

## 2024-11-25 VITALS — HEIGHT: 70 IN | WEIGHT: 205 LBS | BODY MASS INDEX: 29.35 KG/M2

## 2024-11-25 DIAGNOSIS — R42 DIZZINESS AND GIDDINESS: ICD-10-CM

## 2024-11-25 DIAGNOSIS — H93.19 TINNITUS, UNSPECIFIED EAR: ICD-10-CM

## 2024-11-25 DIAGNOSIS — J34.89 OTHER SPECIFIED DISORDERS OF NOSE AND NASAL SINUSES: ICD-10-CM

## 2024-11-25 DIAGNOSIS — R51.9 HEADACHE, UNSPECIFIED: ICD-10-CM

## 2024-11-25 DIAGNOSIS — H81.90 UNSPECIFIED DISORDER OF VESTIBULAR FUNCTION, UNSPECIFIED EAR: ICD-10-CM

## 2024-11-25 DIAGNOSIS — H90.3 SENSORINEURAL HEARING LOSS, BILATERAL: ICD-10-CM

## 2024-11-25 PROCEDURE — 31231 NASAL ENDOSCOPY DX: CPT

## 2024-11-25 PROCEDURE — 99213 OFFICE O/P EST LOW 20 MIN: CPT | Mod: 25

## 2024-11-25 PROCEDURE — 92557 COMPREHENSIVE HEARING TEST: CPT

## 2024-11-25 PROCEDURE — 92550 TYMPANOMETRY & REFLEX THRESH: CPT | Mod: 52

## 2024-11-25 RX ORDER — AZELASTINE HYDROCHLORIDE 137 UG/1
137 SPRAY, METERED NASAL DAILY
Qty: 3 | Refills: 3 | Status: ACTIVE | COMMUNITY
Start: 2024-11-25 | End: 1900-01-01

## 2025-01-13 ENCOUNTER — APPOINTMENT (OUTPATIENT)
Dept: OTOLARYNGOLOGY | Facility: CLINIC | Age: 70
End: 2025-01-13

## 2025-05-20 ENCOUNTER — OUTPATIENT (OUTPATIENT)
Dept: OUTPATIENT SERVICES | Facility: HOSPITAL | Age: 70
LOS: 1 days | End: 2025-05-20
Payer: MEDICARE

## 2025-05-20 DIAGNOSIS — Z96.642 PRESENCE OF LEFT ARTIFICIAL HIP JOINT: Chronic | ICD-10-CM

## 2025-05-20 DIAGNOSIS — M54.2 CERVICALGIA: ICD-10-CM

## 2025-05-20 PROCEDURE — 72040 X-RAY EXAM NECK SPINE 2-3 VW: CPT

## 2025-05-20 PROCEDURE — 72040 X-RAY EXAM NECK SPINE 2-3 VW: CPT | Mod: 26

## 2025-05-21 DIAGNOSIS — M54.2 CERVICALGIA: ICD-10-CM

## 2025-05-28 ENCOUNTER — APPOINTMENT (OUTPATIENT)
Dept: OTOLARYNGOLOGY | Facility: CLINIC | Age: 70
End: 2025-05-28